# Patient Record
Sex: FEMALE | Race: WHITE | NOT HISPANIC OR LATINO | URBAN - METROPOLITAN AREA
[De-identification: names, ages, dates, MRNs, and addresses within clinical notes are randomized per-mention and may not be internally consistent; named-entity substitution may affect disease eponyms.]

---

## 2018-08-13 ENCOUNTER — EMERGENCY (EMERGENCY)
Facility: HOSPITAL | Age: 38
LOS: 0 days | Discharge: HOME | End: 2018-08-13
Attending: EMERGENCY MEDICINE | Admitting: EMERGENCY MEDICINE

## 2018-08-13 VITALS
DIASTOLIC BLOOD PRESSURE: 75 MMHG | RESPIRATION RATE: 18 BRPM | OXYGEN SATURATION: 99 % | SYSTOLIC BLOOD PRESSURE: 113 MMHG | TEMPERATURE: 97 F | HEART RATE: 73 BPM

## 2018-08-13 DIAGNOSIS — R51 HEADACHE: ICD-10-CM

## 2018-08-13 DIAGNOSIS — R27.9 UNSPECIFIED LACK OF COORDINATION: ICD-10-CM

## 2018-08-13 DIAGNOSIS — R20.0 ANESTHESIA OF SKIN: ICD-10-CM

## 2018-08-13 DIAGNOSIS — M54.5 LOW BACK PAIN: ICD-10-CM

## 2018-08-13 LAB
ALBUMIN SERPL ELPH-MCNC: 4.6 G/DL — SIGNIFICANT CHANGE UP (ref 3.5–5.2)
ALP SERPL-CCNC: 43 U/L — SIGNIFICANT CHANGE UP (ref 30–115)
ALT FLD-CCNC: 16 U/L — SIGNIFICANT CHANGE UP (ref 0–41)
ANION GAP SERPL CALC-SCNC: 14 MMOL/L — SIGNIFICANT CHANGE UP (ref 7–14)
AST SERPL-CCNC: 18 U/L — SIGNIFICANT CHANGE UP (ref 0–41)
BASOPHILS # BLD AUTO: 0.04 K/UL — SIGNIFICANT CHANGE UP (ref 0–0.2)
BASOPHILS NFR BLD AUTO: 0.5 % — SIGNIFICANT CHANGE UP (ref 0–1)
BILIRUB SERPL-MCNC: 0.5 MG/DL — SIGNIFICANT CHANGE UP (ref 0.2–1.2)
BUN SERPL-MCNC: 9 MG/DL — LOW (ref 10–20)
CALCIUM SERPL-MCNC: 9.7 MG/DL — SIGNIFICANT CHANGE UP (ref 8.5–10.1)
CHLORIDE SERPL-SCNC: 103 MMOL/L — SIGNIFICANT CHANGE UP (ref 98–110)
CO2 SERPL-SCNC: 23 MMOL/L — SIGNIFICANT CHANGE UP (ref 17–32)
CREAT SERPL-MCNC: 0.5 MG/DL — LOW (ref 0.7–1.5)
EOSINOPHIL # BLD AUTO: 0.02 K/UL — SIGNIFICANT CHANGE UP (ref 0–0.7)
EOSINOPHIL NFR BLD AUTO: 0.3 % — SIGNIFICANT CHANGE UP (ref 0–8)
GLUCOSE SERPL-MCNC: 92 MG/DL — SIGNIFICANT CHANGE UP (ref 70–99)
HCT VFR BLD CALC: 41 % — SIGNIFICANT CHANGE UP (ref 37–47)
HGB BLD-MCNC: 13.9 G/DL — SIGNIFICANT CHANGE UP (ref 12–16)
IMM GRANULOCYTES NFR BLD AUTO: 0.3 % — SIGNIFICANT CHANGE UP (ref 0.1–0.3)
LYMPHOCYTES # BLD AUTO: 1.54 K/UL — SIGNIFICANT CHANGE UP (ref 1.2–3.4)
LYMPHOCYTES # BLD AUTO: 20.6 % — SIGNIFICANT CHANGE UP (ref 20.5–51.1)
MAGNESIUM SERPL-MCNC: 2 MG/DL — SIGNIFICANT CHANGE UP (ref 1.8–2.4)
MCHC RBC-ENTMCNC: 28.3 PG — SIGNIFICANT CHANGE UP (ref 27–31)
MCHC RBC-ENTMCNC: 33.9 G/DL — SIGNIFICANT CHANGE UP (ref 32–37)
MCV RBC AUTO: 83.3 FL — SIGNIFICANT CHANGE UP (ref 81–99)
MONOCYTES # BLD AUTO: 0.5 K/UL — SIGNIFICANT CHANGE UP (ref 0.1–0.6)
MONOCYTES NFR BLD AUTO: 6.7 % — SIGNIFICANT CHANGE UP (ref 1.7–9.3)
NEUTROPHILS # BLD AUTO: 5.36 K/UL — SIGNIFICANT CHANGE UP (ref 1.4–6.5)
NEUTROPHILS NFR BLD AUTO: 71.6 % — SIGNIFICANT CHANGE UP (ref 42.2–75.2)
NRBC # BLD: 0 /100 WBCS — SIGNIFICANT CHANGE UP (ref 0–0)
PLATELET # BLD AUTO: 262 K/UL — SIGNIFICANT CHANGE UP (ref 130–400)
POTASSIUM SERPL-MCNC: 4.6 MMOL/L — SIGNIFICANT CHANGE UP (ref 3.5–5)
POTASSIUM SERPL-SCNC: 4.6 MMOL/L — SIGNIFICANT CHANGE UP (ref 3.5–5)
PROT SERPL-MCNC: 7.3 G/DL — SIGNIFICANT CHANGE UP (ref 6–8)
RBC # BLD: 4.92 M/UL — SIGNIFICANT CHANGE UP (ref 4.2–5.4)
RBC # FLD: 13.3 % — SIGNIFICANT CHANGE UP (ref 11.5–14.5)
SODIUM SERPL-SCNC: 140 MMOL/L — SIGNIFICANT CHANGE UP (ref 135–146)
WBC # BLD: 7.48 K/UL — SIGNIFICANT CHANGE UP (ref 4.8–10.8)
WBC # FLD AUTO: 7.48 K/UL — SIGNIFICANT CHANGE UP (ref 4.8–10.8)

## 2018-08-13 RX ORDER — SODIUM CHLORIDE 9 MG/ML
1000 INJECTION INTRAMUSCULAR; INTRAVENOUS; SUBCUTANEOUS ONCE
Qty: 0 | Refills: 0 | Status: COMPLETED | OUTPATIENT
Start: 2018-08-13 | End: 2018-08-13

## 2018-08-13 RX ORDER — METOCLOPRAMIDE HCL 10 MG
10 TABLET ORAL ONCE
Qty: 0 | Refills: 0 | Status: COMPLETED | OUTPATIENT
Start: 2018-08-13 | End: 2018-08-13

## 2018-08-13 RX ADMIN — SODIUM CHLORIDE 1000 MILLILITER(S): 9 INJECTION INTRAMUSCULAR; INTRAVENOUS; SUBCUTANEOUS at 13:50

## 2018-08-13 RX ADMIN — Medication 10 MILLIGRAM(S): at 13:50

## 2018-08-13 NOTE — ED PROVIDER NOTE - NS ED ROS FT
Review of Systems    Constitutional: (-) fever  Cardiovascular: (-) chest pain, (-) syncope  Respiratory: (-) cough, (-) shortness of breath  Gastrointestinal: (-) vomiting, (-) diarrhea, (-) abdominal pain  Musculoskeletal: (-) neck pain, (-) back pain, (-) joint pain  Integumentary: (-) rash, (-) edema    Except as documented in the HPI, all other systems are negative.

## 2018-08-13 NOTE — ED PROVIDER NOTE - PROGRESS NOTE DETAILS
pt improved, ct and labs discussed, pt comfortable with plan for d/c and follow up with pmd, labs and imaging results printed and given to pt. Pt does not want to wait for ua ucg sample as she notes she had urine test earlier this week and it was negative at that time.

## 2018-08-13 NOTE — ED PROVIDER NOTE - PHYSICAL EXAMINATION
VITAL SIGNS: I have reviewed nursing notes and confirm.  CONSTITUTIONAL: non-toxic, well appearing  SKIN: no rash, no petechiae.  EYES: PERRL, EOMI, pink conjunctiva, anicteric  ENT: tongue midline, no exudates, MMM  NECK: Supple; no meningismus, no JVD  CARD: RRR, no murmurs, equal radial pulses bilaterally 2+  RESP: CTAB, no respiratory distress  ABD: Soft, non-tender, non-distended, no peritoneal signs, no HSM, no CVA tenderness  EXT: Normal ROM x4. No edema. No calves tenderness  NEURO: Alert, oriented. CN2-12 intact, equal strength bilaterally, nl gait.  PSYCH: Cooperative, appropriate.

## 2018-08-13 NOTE — ED PROVIDER NOTE - OBJECTIVE STATEMENT
36 yo female denies significant pmhx p/w head pressure x 1 month. Associated symptoms include intermittent loss of balance and also b/l hand numbness x 2 days. Saw her PMD Dr. Mitchell on Monday and had blood work done but does not have results yet. Pt notes that she also got nervous that something was going on and has been worried x 2 days and having some episodes of breathing fast due to being anxious.  Notes tylenol does relieve pain.  No n/v/f/c/cp/sob/abd pain. No trauma.  No photophobia/neck stiffness/motor deficit. Also notes some lower back pain radiating to right leg x 4 days.  Denies recent travel or exposure to ticks/time in woods or recent rash.  Pressure on head is described as diffuse to top of head.

## 2018-12-03 ENCOUNTER — OUTPATIENT (OUTPATIENT)
Dept: OUTPATIENT SERVICES | Facility: HOSPITAL | Age: 38
LOS: 1 days | Discharge: HOME | End: 2018-12-03

## 2018-12-03 DIAGNOSIS — N64.4 MASTODYNIA: ICD-10-CM

## 2018-12-03 DIAGNOSIS — N63.20 UNSPECIFIED LUMP IN THE LEFT BREAST, UNSPECIFIED QUADRANT: ICD-10-CM

## 2019-11-29 ENCOUNTER — EMERGENCY (EMERGENCY)
Facility: HOSPITAL | Age: 39
LOS: 0 days | Discharge: HOME | End: 2019-11-29
Attending: EMERGENCY MEDICINE | Admitting: EMERGENCY MEDICINE
Payer: SUBSIDIZED

## 2019-11-29 VITALS
DIASTOLIC BLOOD PRESSURE: 80 MMHG | HEART RATE: 78 BPM | TEMPERATURE: 96 F | OXYGEN SATURATION: 98 % | RESPIRATION RATE: 18 BRPM | SYSTOLIC BLOOD PRESSURE: 129 MMHG | WEIGHT: 115.08 LBS

## 2019-11-29 DIAGNOSIS — R10.9 UNSPECIFIED ABDOMINAL PAIN: ICD-10-CM

## 2019-11-29 DIAGNOSIS — R07.2 PRECORDIAL PAIN: ICD-10-CM

## 2019-11-29 DIAGNOSIS — R10.13 EPIGASTRIC PAIN: ICD-10-CM

## 2019-11-29 DIAGNOSIS — R11.0 NAUSEA: ICD-10-CM

## 2019-11-29 DIAGNOSIS — R05 COUGH: ICD-10-CM

## 2019-11-29 LAB
ALBUMIN SERPL ELPH-MCNC: 4.8 G/DL — SIGNIFICANT CHANGE UP (ref 3.5–5.2)
ALP SERPL-CCNC: 44 U/L — SIGNIFICANT CHANGE UP (ref 30–115)
ALT FLD-CCNC: 13 U/L — SIGNIFICANT CHANGE UP (ref 0–41)
ANION GAP SERPL CALC-SCNC: 13 MMOL/L — SIGNIFICANT CHANGE UP (ref 7–14)
AST SERPL-CCNC: 17 U/L — SIGNIFICANT CHANGE UP (ref 0–41)
BASOPHILS # BLD AUTO: 0.04 K/UL — SIGNIFICANT CHANGE UP (ref 0–0.2)
BASOPHILS NFR BLD AUTO: 0.5 % — SIGNIFICANT CHANGE UP (ref 0–1)
BILIRUB SERPL-MCNC: 0.5 MG/DL — SIGNIFICANT CHANGE UP (ref 0.2–1.2)
BUN SERPL-MCNC: 13 MG/DL — SIGNIFICANT CHANGE UP (ref 10–20)
CALCIUM SERPL-MCNC: 9.2 MG/DL — SIGNIFICANT CHANGE UP (ref 8.5–10.1)
CHLORIDE SERPL-SCNC: 104 MMOL/L — SIGNIFICANT CHANGE UP (ref 98–110)
CO2 SERPL-SCNC: 25 MMOL/L — SIGNIFICANT CHANGE UP (ref 17–32)
CREAT SERPL-MCNC: 0.5 MG/DL — LOW (ref 0.7–1.5)
EOSINOPHIL # BLD AUTO: 0.02 K/UL — SIGNIFICANT CHANGE UP (ref 0–0.7)
EOSINOPHIL NFR BLD AUTO: 0.3 % — SIGNIFICANT CHANGE UP (ref 0–8)
GLUCOSE SERPL-MCNC: 89 MG/DL — SIGNIFICANT CHANGE UP (ref 70–99)
HCT VFR BLD CALC: 40.4 % — SIGNIFICANT CHANGE UP (ref 37–47)
HGB BLD-MCNC: 13.4 G/DL — SIGNIFICANT CHANGE UP (ref 12–16)
IMM GRANULOCYTES NFR BLD AUTO: 0.1 % — SIGNIFICANT CHANGE UP (ref 0.1–0.3)
LIDOCAIN IGE QN: 43 U/L — SIGNIFICANT CHANGE UP (ref 7–60)
LYMPHOCYTES # BLD AUTO: 1.23 K/UL — SIGNIFICANT CHANGE UP (ref 1.2–3.4)
LYMPHOCYTES # BLD AUTO: 16.4 % — LOW (ref 20.5–51.1)
MCHC RBC-ENTMCNC: 28.2 PG — SIGNIFICANT CHANGE UP (ref 27–31)
MCHC RBC-ENTMCNC: 33.2 G/DL — SIGNIFICANT CHANGE UP (ref 32–37)
MCV RBC AUTO: 85.1 FL — SIGNIFICANT CHANGE UP (ref 81–99)
MONOCYTES # BLD AUTO: 0.44 K/UL — SIGNIFICANT CHANGE UP (ref 0.1–0.6)
MONOCYTES NFR BLD AUTO: 5.9 % — SIGNIFICANT CHANGE UP (ref 1.7–9.3)
NEUTROPHILS # BLD AUTO: 5.76 K/UL — SIGNIFICANT CHANGE UP (ref 1.4–6.5)
NEUTROPHILS NFR BLD AUTO: 76.8 % — HIGH (ref 42.2–75.2)
NRBC # BLD: 0 /100 WBCS — SIGNIFICANT CHANGE UP (ref 0–0)
PLATELET # BLD AUTO: 263 K/UL — SIGNIFICANT CHANGE UP (ref 130–400)
POTASSIUM SERPL-MCNC: 3.9 MMOL/L — SIGNIFICANT CHANGE UP (ref 3.5–5)
POTASSIUM SERPL-SCNC: 3.9 MMOL/L — SIGNIFICANT CHANGE UP (ref 3.5–5)
PROT SERPL-MCNC: 7.1 G/DL — SIGNIFICANT CHANGE UP (ref 6–8)
RBC # BLD: 4.75 M/UL — SIGNIFICANT CHANGE UP (ref 4.2–5.4)
RBC # FLD: 13 % — SIGNIFICANT CHANGE UP (ref 11.5–14.5)
SODIUM SERPL-SCNC: 142 MMOL/L — SIGNIFICANT CHANGE UP (ref 135–146)
TROPONIN T SERPL-MCNC: <0.01 NG/ML — SIGNIFICANT CHANGE UP
WBC # BLD: 7.5 K/UL — SIGNIFICANT CHANGE UP (ref 4.8–10.8)
WBC # FLD AUTO: 7.5 K/UL — SIGNIFICANT CHANGE UP (ref 4.8–10.8)

## 2019-11-29 PROCEDURE — 99284 EMERGENCY DEPT VISIT MOD MDM: CPT

## 2019-11-29 RX ORDER — DIPHENHYDRAMINE HYDROCHLORIDE AND LIDOCAINE HYDROCHLORIDE AND ALUMINUM HYDROXIDE AND MAGNESIUM HYDRO
30 KIT ONCE
Refills: 0 | Status: COMPLETED | OUTPATIENT
Start: 2019-11-29 | End: 2019-11-29

## 2019-11-29 RX ORDER — PANTOPRAZOLE SODIUM 20 MG/1
40 TABLET, DELAYED RELEASE ORAL ONCE
Refills: 0 | Status: COMPLETED | OUTPATIENT
Start: 2019-11-29 | End: 2019-11-29

## 2019-11-29 RX ORDER — SUCRALFATE 1 G
1 TABLET ORAL ONCE
Refills: 0 | Status: COMPLETED | OUTPATIENT
Start: 2019-11-29 | End: 2019-11-29

## 2019-11-29 RX ORDER — FAMOTIDINE 10 MG/ML
20 INJECTION INTRAVENOUS ONCE
Refills: 0 | Status: COMPLETED | OUTPATIENT
Start: 2019-11-29 | End: 2019-11-29

## 2019-11-29 RX ORDER — SODIUM CHLORIDE 9 MG/ML
1000 INJECTION, SOLUTION INTRAVENOUS ONCE
Refills: 0 | Status: COMPLETED | OUTPATIENT
Start: 2019-11-29 | End: 2019-11-29

## 2019-11-29 RX ADMIN — DIPHENHYDRAMINE HYDROCHLORIDE AND LIDOCAINE HYDROCHLORIDE AND ALUMINUM HYDROXIDE AND MAGNESIUM HYDRO 30 MILLILITER(S): KIT at 13:53

## 2019-11-29 RX ADMIN — SODIUM CHLORIDE 1000 MILLILITER(S): 9 INJECTION, SOLUTION INTRAVENOUS at 12:39

## 2019-11-29 RX ADMIN — FAMOTIDINE 20 MILLIGRAM(S): 10 INJECTION INTRAVENOUS at 12:40

## 2019-11-29 RX ADMIN — PANTOPRAZOLE SODIUM 40 MILLIGRAM(S): 20 TABLET, DELAYED RELEASE ORAL at 12:40

## 2019-11-29 RX ADMIN — Medication 1 GRAM(S): at 13:53

## 2019-11-29 NOTE — ED ADULT NURSE NOTE - NSIMPLEMENTINTERV_GEN_ALL_ED
Implemented All Universal Safety Interventions:  [place] to call system. Call bell, personal items and telephone within reach. Instruct patient to call for assistance. Room bathroom lighting operational. Non-slip footwear when patient is off stretcher. Physically safe environment: no spills, clutter or unnecessary equipment. Stretcher in lowest position, wheels locked, appropriate side rails in place.

## 2019-11-29 NOTE — ED PROVIDER NOTE - PHYSICAL EXAMINATION
PHYSICAL EXAM:  GENERAL: NAD, lying in bed comfortably  HEAD:  Atraumatic, Normocephalic  EYES: EOMI, PERRLA, conjunctiva and sclera clear  ENT: Moist mucous membranes  NECK: Supple, No JVD  CHEST/LUNG: Clear to auscultation bilaterally; No rales, rhonchi, wheezing, or rubs. Unlabored respirations  HEART: Regular rate and rhythm; No murmurs, rubs, or gallops  ABDOMEN: Bowel sounds present; soft, epigastric tenderness. No guarding or rebound tenderness  EXTREMITIES:  2+ Peripheral Pulses, brisk capillary refill. No clubbing, cyanosis, or edema  NERVOUS SYSTEM:  Alert & Oriented X3, speech clear. No deficits   MSK: FROM all 4 extremities, full and equal strength  SKIN: No rashes or lesions

## 2019-11-29 NOTE — ED PROVIDER NOTE - NSFOLLOWUPCLINICS_GEN_ALL_ED_FT
A Family Medicine Doctor  Family Medicine  .  [place]   Phone:   Fax:   Follow Up Time:     A Gastroenterologist  Gastroenterology  .  [place]   Phone:   Fax:   Follow Up Time:

## 2019-11-29 NOTE — ED PROVIDER NOTE - PATIENT PORTAL LINK FT
You can access the FollowMyHealth Patient Portal offered by [facility] by registering at the following website: [email or web address]. By joining [employer]’s FollowMyHealth portal, you will also be able to view your health information using other applications (apps) compatible with our system.

## 2019-11-29 NOTE — ED PROVIDER NOTE - NSFOLLOWUPINSTRUCTIONS_ED_ALL_ED_FT
Please follow up with PMD in 1-2 days and Gastroenterology within 5-7 days.  Please return to the ED if symptoms worsen or if you experience symptoms including but not limited to vomiting, blood in vomit, bloody stool, black stool, worsening chest pain, palpitation, dizziness.

## 2019-11-29 NOTE — ED PROVIDER NOTE - OBJECTIVE STATEMENT
39 yr F no PMH presenting with abd pain / chest pain. Abd pain persistent for the last month with radiation to substernal pain. 8/10 sharp worse at night when lying down. Associated with nausea, persistent dry cough, acidic taste in mouth in am. seen by cardiology and reports stress test neg. Seen by GI started on PPI yesterday without improvement.   Denies weight lose, diarrhea, fever, chills, diaphoresis, palpitation, headache.

## 2019-11-29 NOTE — ED PROVIDER NOTE - PROGRESS NOTE DETAILS
Pt reports symptoms improved and pain resolved. Will discharge home with follow up with GI ATTENDING NOTE: 40 y/o f PMH prior cardiac work up, including stress testing in last week due to pain in L chest and ABD all came back negative. Pt is here today for sx that have not resolved and are worse with lying down associated with throat burning and cough. Exam: NAD, NCAT, HEENT: mmm, EOMI, PERRLA, Neck: supple, nontender, nl ROM, Heart: RRR, no murmur, Extremities 2+ b/l pulses intact. Lungs: BCTA, no signs of increased WOB, Abd: NTND, no guarding or rebound, no hernia palpated, no CVAT. MSK: chest, back, and ext nontender, nl rom, no deformity. Neuro: A&amp;Ox3, normal strength, nl sensation throughout, normal speech. A/P: likely gastritis/ dyspepsia vs ulcer. Evaluate once again for ACS with EKG and troponin, labs, symptom, control reassess.

## 2019-11-29 NOTE — ED PROVIDER NOTE - CLINICAL SUMMARY MEDICAL DECISION MAKING FREE TEXT BOX
pw abd pain radiating into chest, has had cardiac workup very recently, all negative. labs and imaging within normal limits. refused EKG. Patient to be discharged from ED. Any available test results were discussed with family. Verbal instructions given, including instructions to return to ED immediately for any new, worsening, or concerning symptoms. family endorsed understanding. Written discharge instructions additionally given, including follow-up plan.

## 2019-12-11 ENCOUNTER — TRANSCRIPTION ENCOUNTER (OUTPATIENT)
Age: 39
End: 2019-12-11

## 2019-12-11 ENCOUNTER — OUTPATIENT (OUTPATIENT)
Dept: OUTPATIENT SERVICES | Facility: HOSPITAL | Age: 39
LOS: 1 days | Discharge: HOME | End: 2019-12-11
Payer: COMMERCIAL

## 2019-12-11 ENCOUNTER — RESULT REVIEW (OUTPATIENT)
Age: 39
End: 2019-12-11

## 2019-12-11 VITALS
HEART RATE: 77 BPM | TEMPERATURE: 98 F | WEIGHT: 115.96 LBS | DIASTOLIC BLOOD PRESSURE: 73 MMHG | SYSTOLIC BLOOD PRESSURE: 109 MMHG | HEIGHT: 61 IN | RESPIRATION RATE: 20 BRPM

## 2019-12-11 VITALS — RESPIRATION RATE: 17 BRPM | HEART RATE: 63 BPM | SYSTOLIC BLOOD PRESSURE: 100 MMHG | DIASTOLIC BLOOD PRESSURE: 67 MMHG

## 2019-12-11 PROCEDURE — 88305 TISSUE EXAM BY PATHOLOGIST: CPT | Mod: 26

## 2019-12-11 PROCEDURE — 88312 SPECIAL STAINS GROUP 1: CPT | Mod: 26

## 2019-12-11 RX ORDER — OMEPRAZOLE 10 MG/1
1 CAPSULE, DELAYED RELEASE ORAL
Qty: 0 | Refills: 0 | DISCHARGE

## 2019-12-11 NOTE — CHART NOTE - NSCHARTNOTEFT_GEN_A_CORE
PACU ANESTHESIA ADMISSION NOTE      Procedure:   Post op diagnosis:      ____  Intubated  TV:______       Rate: ______      FiO2: ______    __x__  Patent Airway    __x__  Full return of protective reflexes    __x__  Full recovery from anesthesia / back to baseline     Vitals:   T:           R:  11                BP:   111/65               Sat: 99                  P: 65      Mental Status:  _x___ Awake   ___x__ Alert   _____ Drowsy   _____ Sedated    Nausea/Vomiting:  __x__ NO  ______Yes,   See Post - Op Orders          Pain Scale (0-10):  _x____    Treatment: ____ None    ____ See Post - Op/PCA Orders    Post - Operative Fluids:   __x__ Oral   ____ See Post - Op Orders    Plan: Discharge:   __x__Home       _____Floor     _____Critical Care    _____  Other:_________________    Comments: tolerated procedure well

## 2019-12-12 LAB — SURGICAL PATHOLOGY STUDY: SIGNIFICANT CHANGE UP

## 2019-12-16 DIAGNOSIS — K21.9 GASTRO-ESOPHAGEAL REFLUX DISEASE WITHOUT ESOPHAGITIS: ICD-10-CM

## 2019-12-16 DIAGNOSIS — K20.8 OTHER ESOPHAGITIS: ICD-10-CM

## 2019-12-16 DIAGNOSIS — K29.50 UNSPECIFIED CHRONIC GASTRITIS WITHOUT BLEEDING: ICD-10-CM

## 2019-12-16 DIAGNOSIS — K44.9 DIAPHRAGMATIC HERNIA WITHOUT OBSTRUCTION OR GANGRENE: ICD-10-CM

## 2019-12-16 DIAGNOSIS — B96.81 HELICOBACTER PYLORI [H. PYLORI] AS THE CAUSE OF DISEASES CLASSIFIED ELSEWHERE: ICD-10-CM

## 2019-12-16 DIAGNOSIS — K29.80 DUODENITIS WITHOUT BLEEDING: ICD-10-CM

## 2020-05-02 ENCOUNTER — EMERGENCY (EMERGENCY)
Facility: HOSPITAL | Age: 40
LOS: 0 days | Discharge: HOME | End: 2020-05-02
Attending: STUDENT IN AN ORGANIZED HEALTH CARE EDUCATION/TRAINING PROGRAM | Admitting: STUDENT IN AN ORGANIZED HEALTH CARE EDUCATION/TRAINING PROGRAM
Payer: COMMERCIAL

## 2020-05-02 VITALS
SYSTOLIC BLOOD PRESSURE: 110 MMHG | TEMPERATURE: 98 F | OXYGEN SATURATION: 100 % | HEART RATE: 78 BPM | DIASTOLIC BLOOD PRESSURE: 68 MMHG | RESPIRATION RATE: 18 BRPM

## 2020-05-02 DIAGNOSIS — U07.1 COVID-19: ICD-10-CM

## 2020-05-02 DIAGNOSIS — Z79.891 LONG TERM (CURRENT) USE OF OPIATE ANALGESIC: ICD-10-CM

## 2020-05-02 DIAGNOSIS — R07.89 OTHER CHEST PAIN: ICD-10-CM

## 2020-05-02 LAB
ALBUMIN SERPL ELPH-MCNC: 5 G/DL — SIGNIFICANT CHANGE UP (ref 3.5–5.2)
ALP SERPL-CCNC: 53 U/L — SIGNIFICANT CHANGE UP (ref 30–115)
ALT FLD-CCNC: 15 U/L — SIGNIFICANT CHANGE UP (ref 0–41)
ANION GAP SERPL CALC-SCNC: 13 MMOL/L — SIGNIFICANT CHANGE UP (ref 7–14)
AST SERPL-CCNC: 20 U/L — SIGNIFICANT CHANGE UP (ref 0–41)
BILIRUB SERPL-MCNC: 0.7 MG/DL — SIGNIFICANT CHANGE UP (ref 0.2–1.2)
BUN SERPL-MCNC: 8 MG/DL — LOW (ref 10–20)
CALCIUM SERPL-MCNC: 10 MG/DL — SIGNIFICANT CHANGE UP (ref 8.5–10.1)
CHLORIDE SERPL-SCNC: 102 MMOL/L — SIGNIFICANT CHANGE UP (ref 98–110)
CO2 SERPL-SCNC: 25 MMOL/L — SIGNIFICANT CHANGE UP (ref 17–32)
CREAT SERPL-MCNC: 0.6 MG/DL — LOW (ref 0.7–1.5)
GLUCOSE SERPL-MCNC: 100 MG/DL — HIGH (ref 70–99)
HCT VFR BLD CALC: 41.1 % — SIGNIFICANT CHANGE UP (ref 37–47)
HGB BLD-MCNC: 13.4 G/DL — SIGNIFICANT CHANGE UP (ref 12–16)
MCHC RBC-ENTMCNC: 27 PG — SIGNIFICANT CHANGE UP (ref 27–31)
MCHC RBC-ENTMCNC: 32.6 G/DL — SIGNIFICANT CHANGE UP (ref 32–37)
MCV RBC AUTO: 82.9 FL — SIGNIFICANT CHANGE UP (ref 81–99)
NRBC # BLD: 0 /100 WBCS — SIGNIFICANT CHANGE UP (ref 0–0)
PLATELET # BLD AUTO: 271 K/UL — SIGNIFICANT CHANGE UP (ref 130–400)
POTASSIUM SERPL-MCNC: 4.4 MMOL/L — SIGNIFICANT CHANGE UP (ref 3.5–5)
POTASSIUM SERPL-SCNC: 4.4 MMOL/L — SIGNIFICANT CHANGE UP (ref 3.5–5)
PROT SERPL-MCNC: 7.9 G/DL — SIGNIFICANT CHANGE UP (ref 6–8)
RBC # BLD: 4.96 M/UL — SIGNIFICANT CHANGE UP (ref 4.2–5.4)
RBC # FLD: 14.7 % — HIGH (ref 11.5–14.5)
SODIUM SERPL-SCNC: 140 MMOL/L — SIGNIFICANT CHANGE UP (ref 135–146)
TROPONIN T SERPL-MCNC: <0.01 NG/ML — SIGNIFICANT CHANGE UP
WBC # BLD: 11.4 K/UL — HIGH (ref 4.8–10.8)
WBC # FLD AUTO: 11.4 K/UL — HIGH (ref 4.8–10.8)

## 2020-05-02 PROCEDURE — 99285 EMERGENCY DEPT VISIT HI MDM: CPT

## 2020-05-02 PROCEDURE — 93971 EXTREMITY STUDY: CPT | Mod: 26,LT

## 2020-05-02 PROCEDURE — 71045 X-RAY EXAM CHEST 1 VIEW: CPT | Mod: 26

## 2020-05-02 PROCEDURE — 93010 ELECTROCARDIOGRAM REPORT: CPT

## 2020-05-02 NOTE — ED ADULT NURSE NOTE - OBJECTIVE STATEMENT
Pt c/o left sided chest pain that radiates to left neck and arm over a few days. Pt endorses she was covid positive 3 weeks ago, still endorses mild cough and generalized weakness. Denies fever, chills, SOB, N/V, abdominal pain, diarrhea.

## 2020-05-02 NOTE — ED ADULT NURSE NOTE - NSIMPLEMENTINTERV_GEN_ALL_ED
Implemented All Universal Safety Interventions:  [place] to call system. Call bell, personal items and telephone within reach. Instruct patient to call for assistance. Room bathroom lighting operational. Non-slip footwear when patient is off stretcher. Physically safe environment: no spills, clutter or unnecessary equipment. Stretcher in lowest position, wheels locked, appropriate side rails in place.

## 2020-05-02 NOTE — ED PROVIDER NOTE - PATIENT PORTAL LINK FT
You can access the FollowMyHealth Patient Portal offered by [facility] by registering at the following website: [email or web address]. By joining [employer]’s FollowMyHealth portal, you will also be able to view your health information using other applications (apps) compatible with our system.

## 2020-05-02 NOTE — ED PROVIDER NOTE - CLINICAL SUMMARY MEDICAL DECISION MAKING FREE TEXT BOX
pt w/ chest and arm pain. cxr wnl. trop neg. ekg non ischemic, US no UE DVT. IMP: COVID. Do not suspect yanci cardiac or vascular etiology given HPI, PE, w/up. Discussed all available results.  Pt and/ or family understand results, plan of care, outpt follow up as discussed, and signs and symptoms for ED return.  Pt/ family is comfortable with discharge.

## 2020-05-02 NOTE — ED PROVIDER NOTE - OBJECTIVE STATEMENT
The pt is a 39y F w/ no pmh, covid (+) diagnosis 3 weeks ago presenting for intermittent, retrosternal chest pain that radiates to the L neck, L arm x several days. Pt also notes LUE feeling heavy. No aggravation/alleviating factors. She went to [facility] which she says wanted her to get evaluated for blood clots. Pt says her covid symptoms have resolved except for some cough, generalized body aches which she still has intermittently. Denies fever, SOB, N/V, abdominal pain, diarrhea. No surgical hx.

## 2020-05-02 NOTE — ED PROVIDER NOTE - NSFOLLOWUPINSTRUCTIONS_ED_ALL_ED_FT
FOLLOW UP WITH YOUR DOCTOR IN 3-5 DAYS IF SYMPTOMS DO NOT IMPROVE.  FOR NEW OR WORSENING SYMPTOMS, RETURN TO THE ER.      If you are sick with COVID-19 or suspect you are infected with the virus that causes COVID-19, follow the steps below to help prevent the disease from spreading to people in your home and community.   https://www.cdc.gov/coronavirus/2019-ncov/downloads/sick-with-2019-nCoV-fact-sheet.pdf    Stay home except to get medical care   You should restrict activities outside your home, except for getting medical care. Do not go to work, school, or public areas. Avoid using public transportation, ride-sharing, or taxis.   Separate yourself from other people and animals in your home   People: As much as possible, you should stay in a specific room and away from other people in your home. Also, you should use a separate bathroom, if available.   Animals: Do not handle pets or other animals while sick. See COVID-19 and Animals for more information.   Call ahead before visiting your doctor   If you have a medical appointment, call the healthcare provider and tell them that you have or may have COVID-19. This will help the healthcare provider’s office take steps to keep other people from getting infected or exposed.   Wear a facemask   You should wear a facemask when you are around other people (e.g., sharing a room or vehicle) or pets and before you enter a healthcare provider’s office. If you are not able to wear a facemask (for example, because it causes trouble breathing), then people who live with you should not stay in the same room with you, or they should wear a facemask if they enter your room.   Cover your coughs and sneezes   Cover your mouth and nose with a tissue when you cough or sneeze. Throw used tissues in a lined trash can; immediately wash your hands with soap and water for at least 20 seconds or clean your hands with an alcohol-based hand  that contains at least 60% alcohol covering all surfaces of your hands and rubbing them together until they feel dry. Soap and water should be used preferentially if hands are visibly dirty.   Avoid sharing personal household items   You should not share dishes, drinking glasses, cups, eating utensils, towels, or bedding with other people or pets in your home. After using these items, they should be washed thoroughly with soap and water.   Clean your hands often   Wash your hands often with soap and water for at least 20 seconds. If soap and water are not available, clean your hands with an alcohol-based hand  that contains at least 60% alcohol, covering all surfaces of your hands and rubbing them together until they feel dry. Soap and water should be used preferentially if hands are visibly dirty. Avoid touching your eyes, nose, and mouth with unwashed hands.   Clean all “high-touch” surfaces every day   High touch surfaces include counters, tabletops, doorknobs, bathroom fixtures, toilets, phones, keyboards, tablets, and bedside tables. Also, clean any surfaces that may have blood, stool, or body fluids on them. Use a household cleaning spray or wipe, according to the label instructions. Labels contain instructions for safe and effective use of the cleaning product including precautions you should take when applying the product, such as wearing gloves and making sure you have good ventilation during use of the product.   Monitor your symptoms   Seek prompt medical attention if your illness is worsening (e.g., difficulty breathing). Before seeking care, call your healthcare provider and tell them that you have, or are being evaluated for, COVID-19. Put on a facemask before you enter the facility. These steps will help the healthcare provider’s office to keep other people in the office or waiting room from getting infected or exposed. Ask your healthcare provider to call the local or state health department. Persons who are placed under active monitoring or facilitated self-monitoring should follow instructions provided by their local health department or occupational health professionals, as appropriate. When working with your local health department check their available hours. If you have a medical emergency and need to call 911, notify the dispatch personnel that you have, or are being evaluated for COVID-19. If possible, put on a facemask before emergency medical services arrive.   Discontinuing home isolation   Patients with confirmed COVID-19 should remain under home isolation precautions until the risk of secondary transmission to others is thought to be low. The decision to discontinue home isolation precautions should be made on a case-by-case basis, in consultation with healthcare providers and state and local health departments.  For more information: www.cdc.gov/COVID19      Chest Pain    Chest pain can be caused by many different conditions which may or may not be dangerous. Causes include heartburn, lung infections, heart attack, blood clot in lungs, skin infections, strain or damage to muscle, cartilage, or bones, etc. Lab tests or other studies including an electrocardiogram (EKG) may have been performed to find the cause of your pain. Make sure to follow up with a cardiologist or as instructed by your health care professional.    SEEK IMMEDIATE MEDICAL CARE IF YOU HAVE THE FOLLOWING SYMPTOMS: worsening chest pain, coughing up blood, unexplained back/neck/jaw pain, severe abdominal pain, dizziness or lightheadedness, shortness of breath, sweaty or clammy skin, vomiting, or racing heart beat. These symptoms may represent a serious problem that is an emergency. Do not wait to see if the symptoms will go away. Get medical help right away. Call your local emergency services (911 in the [place] Do not drive yourself to the hospital.

## 2020-05-02 NOTE — ED PROVIDER NOTE - ATTENDING CONTRIBUTION TO CARE
38 y/o F no sig pmh, no smoking, COVID (+) test 3wks ago, p/w intermittent chest discomfort, improving x 6wks. + myalgia + fatigue. + cough. c/o LUE arm "heaviness" and rad of discomfort from chest to neck x 3-4d. No fever, trauma, neuro complaint. Pt describes arm pain somewhat in venous distribution (?) ROS PE above; LUE NL inspection, NL pulses, well perfused. NL exam. Pt has no complaints presently. IMP: cp low risk. consider VTE considering COVID. P: labs, cxr, ekg, UE Duplex, reassess.

## 2020-05-02 NOTE — ED PROVIDER NOTE - NS ED ROS FT
Constitutional: (-) fever  Eyes/ENT: (-) blurry vision, (-) epistaxis  Cardiovascular: (+) chest pain, (-) syncope  Respiratory: (+) cough, (-) shortness of breath  Gastrointestinal: (-) vomiting, (-) diarrhea  Musculoskeletal: (+) neck pain, (+) LUE pain, (-) back pain, (-) joint pain  Integumentary: (-) rash, (-) edema  Neurological: (-) headache, (-) altered mental status  Psychiatric: (-) hallucinations  Allergic/Immunologic: (-) pruritus

## 2020-05-02 NOTE — ED PROVIDER NOTE - PHYSICAL EXAMINATION
Vital Signs: I have reviewed the initial vital signs.  Constitutional: well-nourished, appears stated age, no acute distress  Cardiovascular: regular rate, regular rhythm, well-perfused extremities  Respiratory: unlabored respiratory effort, clear to auscultation bilaterally  Gastrointestinal: soft, non-tender abdomen  Musculoskeletal: supple neck, no lower extremity edema. LUE w/ no edema, no palpable tenderness. Neurovascularly intact.   Integumentary: warm, dry. (+) Erythematous round rash 2x2cm L volar wrist area  Neurologic: awake, alert, extremities’ motor and sensory functions grossly intact  Psychiatric: appropriate mood, appropriate affect

## 2020-08-25 ENCOUNTER — OUTPATIENT (OUTPATIENT)
Dept: OUTPATIENT SERVICES | Facility: HOSPITAL | Age: 40
LOS: 1 days | Discharge: HOME | End: 2020-08-25
Payer: COMMERCIAL

## 2020-08-25 DIAGNOSIS — N64.52 NIPPLE DISCHARGE: ICD-10-CM

## 2020-08-25 PROCEDURE — 77066 DX MAMMO INCL CAD BI: CPT | Mod: 26

## 2020-08-25 PROCEDURE — 76642 ULTRASOUND BREAST LIMITED: CPT | Mod: 26,LT

## 2020-08-25 PROCEDURE — G0279: CPT | Mod: 26

## 2022-01-09 ENCOUNTER — INPATIENT (INPATIENT)
Facility: HOSPITAL | Age: 42
LOS: 1 days | Discharge: HOME | End: 2022-01-11
Attending: INTERNAL MEDICINE | Admitting: INTERNAL MEDICINE
Payer: COMMERCIAL

## 2022-01-09 VITALS
SYSTOLIC BLOOD PRESSURE: 124 MMHG | HEIGHT: 61 IN | TEMPERATURE: 102 F | HEART RATE: 113 BPM | RESPIRATION RATE: 18 BRPM | DIASTOLIC BLOOD PRESSURE: 74 MMHG | OXYGEN SATURATION: 99 % | WEIGHT: 119.93 LBS

## 2022-01-09 LAB
ALBUMIN SERPL ELPH-MCNC: 4.6 G/DL — SIGNIFICANT CHANGE UP (ref 3.5–5.2)
ALP SERPL-CCNC: 70 U/L — SIGNIFICANT CHANGE UP (ref 30–115)
ALT FLD-CCNC: 34 U/L — SIGNIFICANT CHANGE UP (ref 0–41)
ANION GAP SERPL CALC-SCNC: 16 MMOL/L — HIGH (ref 7–14)
APPEARANCE UR: CLEAR — SIGNIFICANT CHANGE UP
AST SERPL-CCNC: 36 U/L — SIGNIFICANT CHANGE UP (ref 0–41)
BACTERIA # UR AUTO: NEGATIVE — SIGNIFICANT CHANGE UP
BASOPHILS # BLD AUTO: 0.01 K/UL — SIGNIFICANT CHANGE UP (ref 0–0.2)
BASOPHILS NFR BLD AUTO: 0.2 % — SIGNIFICANT CHANGE UP (ref 0–1)
BILIRUB DIRECT SERPL-MCNC: <0.2 MG/DL — SIGNIFICANT CHANGE UP (ref 0–0.3)
BILIRUB INDIRECT FLD-MCNC: <0.2 MG/DL — SIGNIFICANT CHANGE UP (ref 0.2–1.2)
BILIRUB SERPL-MCNC: <0.2 MG/DL — SIGNIFICANT CHANGE UP (ref 0.2–1.2)
BILIRUB UR-MCNC: NEGATIVE — SIGNIFICANT CHANGE UP
BUN SERPL-MCNC: 8 MG/DL — LOW (ref 10–20)
CALCIUM SERPL-MCNC: 9.2 MG/DL — SIGNIFICANT CHANGE UP (ref 8.5–10.1)
CHLORIDE SERPL-SCNC: 100 MMOL/L — SIGNIFICANT CHANGE UP (ref 98–110)
CO2 SERPL-SCNC: 19 MMOL/L — SIGNIFICANT CHANGE UP (ref 17–32)
COLOR SPEC: SIGNIFICANT CHANGE UP
CREAT SERPL-MCNC: 0.6 MG/DL — LOW (ref 0.7–1.5)
DIFF PNL FLD: ABNORMAL
EOSINOPHIL # BLD AUTO: 0.27 K/UL — SIGNIFICANT CHANGE UP (ref 0–0.7)
EOSINOPHIL NFR BLD AUTO: 6.7 % — SIGNIFICANT CHANGE UP (ref 0–8)
EPI CELLS # UR: 1 /HPF — SIGNIFICANT CHANGE UP (ref 0–5)
GLUCOSE SERPL-MCNC: 99 MG/DL — SIGNIFICANT CHANGE UP (ref 70–99)
GLUCOSE UR QL: NEGATIVE — SIGNIFICANT CHANGE UP
HCG SERPL QL: NEGATIVE — SIGNIFICANT CHANGE UP
HCT VFR BLD CALC: 39.4 % — SIGNIFICANT CHANGE UP (ref 37–47)
HGB BLD-MCNC: 13.1 G/DL — SIGNIFICANT CHANGE UP (ref 12–16)
HYALINE CASTS # UR AUTO: 0 /LPF — SIGNIFICANT CHANGE UP (ref 0–7)
IMM GRANULOCYTES NFR BLD AUTO: 0.5 % — HIGH (ref 0.1–0.3)
KETONES UR-MCNC: ABNORMAL
LACTATE SERPL-SCNC: 1 MMOL/L — SIGNIFICANT CHANGE UP (ref 0.7–2)
LEUKOCYTE ESTERASE UR-ACNC: NEGATIVE — SIGNIFICANT CHANGE UP
LIDOCAIN IGE QN: 55 U/L — SIGNIFICANT CHANGE UP (ref 7–60)
LYMPHOCYTES # BLD AUTO: 0.33 K/UL — LOW (ref 1.2–3.4)
LYMPHOCYTES # BLD AUTO: 8.2 % — LOW (ref 20.5–51.1)
MCHC RBC-ENTMCNC: 26.5 PG — LOW (ref 27–31)
MCHC RBC-ENTMCNC: 33.2 G/DL — SIGNIFICANT CHANGE UP (ref 32–37)
MCV RBC AUTO: 79.6 FL — LOW (ref 81–99)
MONOCYTES # BLD AUTO: 0.27 K/UL — SIGNIFICANT CHANGE UP (ref 0.1–0.6)
MONOCYTES NFR BLD AUTO: 6.7 % — SIGNIFICANT CHANGE UP (ref 1.7–9.3)
NEUTROPHILS # BLD AUTO: 3.14 K/UL — SIGNIFICANT CHANGE UP (ref 1.4–6.5)
NEUTROPHILS NFR BLD AUTO: 77.7 % — HIGH (ref 42.2–75.2)
NITRITE UR-MCNC: NEGATIVE — SIGNIFICANT CHANGE UP
NRBC # BLD: 0 /100 WBCS — SIGNIFICANT CHANGE UP (ref 0–0)
PH UR: 6.5 — SIGNIFICANT CHANGE UP (ref 5–8)
PLATELET # BLD AUTO: 189 K/UL — SIGNIFICANT CHANGE UP (ref 130–400)
POTASSIUM SERPL-MCNC: 4.4 MMOL/L — SIGNIFICANT CHANGE UP (ref 3.5–5)
POTASSIUM SERPL-SCNC: 4.4 MMOL/L — SIGNIFICANT CHANGE UP (ref 3.5–5)
PROT SERPL-MCNC: 7.5 G/DL — SIGNIFICANT CHANGE UP (ref 6–8)
PROT UR-MCNC: SIGNIFICANT CHANGE UP
RBC # BLD: 4.95 M/UL — SIGNIFICANT CHANGE UP (ref 4.2–5.4)
RBC # FLD: 15.1 % — HIGH (ref 11.5–14.5)
RBC CASTS # UR COMP ASSIST: 1 /HPF — SIGNIFICANT CHANGE UP (ref 0–4)
SARS-COV-2 RNA SPEC QL NAA+PROBE: SIGNIFICANT CHANGE UP
SODIUM SERPL-SCNC: 135 MMOL/L — SIGNIFICANT CHANGE UP (ref 135–146)
SP GR SPEC: 1.02 — SIGNIFICANT CHANGE UP (ref 1.01–1.03)
UROBILINOGEN FLD QL: SIGNIFICANT CHANGE UP
WBC # BLD: 4.04 K/UL — LOW (ref 4.8–10.8)
WBC # FLD AUTO: 4.04 K/UL — LOW (ref 4.8–10.8)
WBC UR QL: 1 /HPF — SIGNIFICANT CHANGE UP (ref 0–5)

## 2022-01-09 PROCEDURE — 74177 CT ABD & PELVIS W/CONTRAST: CPT | Mod: 26

## 2022-01-09 PROCEDURE — 99285 EMERGENCY DEPT VISIT HI MDM: CPT

## 2022-01-09 RX ORDER — SODIUM CHLORIDE 9 MG/ML
1500 INJECTION INTRAMUSCULAR; INTRAVENOUS; SUBCUTANEOUS ONCE
Refills: 0 | Status: COMPLETED | OUTPATIENT
Start: 2022-01-09 | End: 2022-01-09

## 2022-01-09 RX ORDER — SODIUM CHLORIDE 9 MG/ML
1000 INJECTION INTRAMUSCULAR; INTRAVENOUS; SUBCUTANEOUS
Refills: 0 | Status: DISCONTINUED | OUTPATIENT
Start: 2022-01-09 | End: 2022-01-10

## 2022-01-09 RX ORDER — SODIUM CHLORIDE 9 MG/ML
1000 INJECTION INTRAMUSCULAR; INTRAVENOUS; SUBCUTANEOUS ONCE
Refills: 0 | Status: COMPLETED | OUTPATIENT
Start: 2022-01-09 | End: 2022-01-09

## 2022-01-09 RX ORDER — CEFTRIAXONE 500 MG/1
1000 INJECTION, POWDER, FOR SOLUTION INTRAMUSCULAR; INTRAVENOUS ONCE
Refills: 0 | Status: COMPLETED | OUTPATIENT
Start: 2022-01-09 | End: 2022-01-09

## 2022-01-09 RX ORDER — ACETAMINOPHEN 500 MG
650 TABLET ORAL ONCE
Refills: 0 | Status: COMPLETED | OUTPATIENT
Start: 2022-01-09 | End: 2022-01-09

## 2022-01-09 RX ORDER — KETOROLAC TROMETHAMINE 30 MG/ML
15 SYRINGE (ML) INJECTION ONCE
Refills: 0 | Status: DISCONTINUED | OUTPATIENT
Start: 2022-01-09 | End: 2022-01-09

## 2022-01-09 RX ADMIN — SODIUM CHLORIDE 1500 MILLILITER(S): 9 INJECTION INTRAMUSCULAR; INTRAVENOUS; SUBCUTANEOUS at 20:16

## 2022-01-09 RX ADMIN — Medication 15 MILLIGRAM(S): at 20:15

## 2022-01-09 RX ADMIN — Medication 650 MILLIGRAM(S): at 20:14

## 2022-01-09 RX ADMIN — Medication 650 MILLIGRAM(S): at 20:44

## 2022-01-09 RX ADMIN — CEFTRIAXONE 1000 MILLIGRAM(S): 500 INJECTION, POWDER, FOR SOLUTION INTRAMUSCULAR; INTRAVENOUS at 20:44

## 2022-01-09 RX ADMIN — Medication 15 MILLIGRAM(S): at 20:45

## 2022-01-09 RX ADMIN — SODIUM CHLORIDE 1500 MILLILITER(S): 9 INJECTION INTRAMUSCULAR; INTRAVENOUS; SUBCUTANEOUS at 21:24

## 2022-01-09 RX ADMIN — CEFTRIAXONE 100 MILLIGRAM(S): 500 INJECTION, POWDER, FOR SOLUTION INTRAMUSCULAR; INTRAVENOUS at 20:14

## 2022-01-09 RX ADMIN — SODIUM CHLORIDE 1000 MILLILITER(S): 9 INJECTION INTRAMUSCULAR; INTRAVENOUS; SUBCUTANEOUS at 21:23

## 2022-01-09 NOTE — ED PROVIDER NOTE - OBJECTIVE STATEMENT
41 year old with no pmhx presenting to er with urinary symptoms/fever. Sts has had 1 week of dysuria, hematuria, urgency and frequency. Pt dx with uti and placed on Bactrim now on day 6/7. sts now having fever x 2 days tmax 102 with assoc b/l flank pain. + nausea. No alterations in bowel habits, chest pain, sob, vomiting, inability to tolerate po, hx of frequent utis, cough, uri symptoms, hx of kidney stones.

## 2022-01-09 NOTE — ED PROVIDER NOTE - PHYSICAL EXAMINATION
CONSTITUTIONAL: Well-appearing; well-nourished; in no apparent distress.   EYES: PERRL; EOM intact.   ENT: normal nose; no rhinorrhea; normal pharynx with no tonsillar hypertrophy.   NECK: Supple; non-tender; no cervical lymphadenopathy.   CARDIOVASCULAR: + tachycardia no murmurs, rubs, or gallops.   RESPIRATORY: Normal chest excursion with respiration; breath sounds clear and equal bilaterally; no wheezes, rhonchi, or rales.  GI/: Normal bowel sounds; non-distended; + suprapubic discomfort. No rebound or guarding. No cva ttp  MS: No evidence of trauma or deformity.  Normal ROM in all four extremities; non-tender to palpation; distal pulses are normal.   SKIN: Normal for age and race; warm; dry; good turgor; no apparent lesions or exudate.   NEURO/PSYCH: A & O x 4; grossly unremarkable. mood and manner are appropriate. Grooming and personal hygiene are appropriate.

## 2022-01-09 NOTE — ED PROVIDER NOTE - NS ED ROS FT
Constitutional: + fever, chills, body aches  Eyes: no redness/discharge/pain/vision changes  ENT: no rhinorrhea/ear pain/sore throat  Cardiac: No chest pain, SOB or edema.  Respiratory: No cough or respiratory distress  GI: + nausea. No vomiting, diarrhea or abdominal pain.  : + dysuria, frequency, urgency hematuria  MS: no pain to back or extremities, no loss of ROM, no weakness  Neuro: No headache or weakness. No LOC.  Skin: No skin rash.  Endocrine: No history of thyroid disease or diabetes.  Except as documented in the HPI, all other systems are negative.

## 2022-01-09 NOTE — ED PROVIDER NOTE - CLINICAL SUMMARY MEDICAL DECISION MAKING FREE TEXT BOX
41-year-old female presents to the ED for flank pain and fevers.  Patient was recently treated for UTI-like symptoms for the past week and has failed outpatient therapy.  Treated outpatient with Bactrim day 6 out of 7.  Physical exam noted to have bilateral flank pain with tenderness to palpation.  Signs noted to be febrile and tachycardic.  Obtain labs, UA.  Labs revealed mild leukopenia UA negative for nitrates and leuk esterase–results unreliable due to outpatient antibiotic therapy.  Initiated parenteral therapy for clinical pyelonephritis.  Admitted to inpatient service for failure of outpatient therapy.

## 2022-01-10 ENCOUNTER — TRANSCRIPTION ENCOUNTER (OUTPATIENT)
Age: 42
End: 2022-01-10

## 2022-01-10 LAB
A1C WITH ESTIMATED AVERAGE GLUCOSE RESULT: 5.5 % — SIGNIFICANT CHANGE UP (ref 4–5.6)
ALBUMIN SERPL ELPH-MCNC: 4 G/DL — SIGNIFICANT CHANGE UP (ref 3.5–5.2)
ALP SERPL-CCNC: 63 U/L — SIGNIFICANT CHANGE UP (ref 30–115)
ALT FLD-CCNC: 38 U/L — SIGNIFICANT CHANGE UP (ref 0–41)
ANION GAP SERPL CALC-SCNC: 13 MMOL/L — SIGNIFICANT CHANGE UP (ref 7–14)
APTT BLD: 29.9 SEC — SIGNIFICANT CHANGE UP (ref 27–39.2)
AST SERPL-CCNC: 37 U/L — SIGNIFICANT CHANGE UP (ref 0–41)
BASOPHILS # BLD AUTO: 0.02 K/UL — SIGNIFICANT CHANGE UP (ref 0–0.2)
BASOPHILS NFR BLD AUTO: 0.5 % — SIGNIFICANT CHANGE UP (ref 0–1)
BILIRUB SERPL-MCNC: <0.2 MG/DL — SIGNIFICANT CHANGE UP (ref 0.2–1.2)
BUN SERPL-MCNC: 7 MG/DL — LOW (ref 10–20)
CALCIUM SERPL-MCNC: 8.5 MG/DL — SIGNIFICANT CHANGE UP (ref 8.5–10.1)
CHLORIDE SERPL-SCNC: 104 MMOL/L — SIGNIFICANT CHANGE UP (ref 98–110)
CHOLEST SERPL-MCNC: 110 MG/DL — SIGNIFICANT CHANGE UP
CO2 SERPL-SCNC: 21 MMOL/L — SIGNIFICANT CHANGE UP (ref 17–32)
CREAT SERPL-MCNC: 0.6 MG/DL — LOW (ref 0.7–1.5)
EOSINOPHIL # BLD AUTO: 0.29 K/UL — SIGNIFICANT CHANGE UP (ref 0–0.7)
EOSINOPHIL NFR BLD AUTO: 7.8 % — SIGNIFICANT CHANGE UP (ref 0–8)
ESTIMATED AVERAGE GLUCOSE: 111 MG/DL — SIGNIFICANT CHANGE UP (ref 68–114)
GLUCOSE SERPL-MCNC: 116 MG/DL — HIGH (ref 70–99)
HCT VFR BLD CALC: 36.7 % — LOW (ref 37–47)
HDLC SERPL-MCNC: 24 MG/DL — LOW
HGB BLD-MCNC: 11.9 G/DL — LOW (ref 12–16)
IMM GRANULOCYTES NFR BLD AUTO: 0.8 % — HIGH (ref 0.1–0.3)
INR BLD: 1.11 RATIO — SIGNIFICANT CHANGE UP (ref 0.65–1.3)
LIPID PNL WITH DIRECT LDL SERPL: 66 MG/DL — SIGNIFICANT CHANGE UP
LYMPHOCYTES # BLD AUTO: 0.71 K/UL — LOW (ref 1.2–3.4)
LYMPHOCYTES # BLD AUTO: 19 % — LOW (ref 20.5–51.1)
MCHC RBC-ENTMCNC: 26.1 PG — LOW (ref 27–31)
MCHC RBC-ENTMCNC: 32.4 G/DL — SIGNIFICANT CHANGE UP (ref 32–37)
MCV RBC AUTO: 80.5 FL — LOW (ref 81–99)
MONOCYTES # BLD AUTO: 0.4 K/UL — SIGNIFICANT CHANGE UP (ref 0.1–0.6)
MONOCYTES NFR BLD AUTO: 10.7 % — HIGH (ref 1.7–9.3)
NEUTROPHILS # BLD AUTO: 2.29 K/UL — SIGNIFICANT CHANGE UP (ref 1.4–6.5)
NEUTROPHILS NFR BLD AUTO: 61.2 % — SIGNIFICANT CHANGE UP (ref 42.2–75.2)
NON HDL CHOLESTEROL: 86 MG/DL — SIGNIFICANT CHANGE UP
NRBC # BLD: 0 /100 WBCS — SIGNIFICANT CHANGE UP (ref 0–0)
PLATELET # BLD AUTO: 171 K/UL — SIGNIFICANT CHANGE UP (ref 130–400)
POTASSIUM SERPL-MCNC: 3.5 MMOL/L — SIGNIFICANT CHANGE UP (ref 3.5–5)
POTASSIUM SERPL-SCNC: 3.5 MMOL/L — SIGNIFICANT CHANGE UP (ref 3.5–5)
PROT SERPL-MCNC: 6.3 G/DL — SIGNIFICANT CHANGE UP (ref 6–8)
PROTHROM AB SERPL-ACNC: 12.8 SEC — SIGNIFICANT CHANGE UP (ref 9.95–12.87)
RBC # BLD: 4.56 M/UL — SIGNIFICANT CHANGE UP (ref 4.2–5.4)
RBC # FLD: 15.6 % — HIGH (ref 11.5–14.5)
SODIUM SERPL-SCNC: 138 MMOL/L — SIGNIFICANT CHANGE UP (ref 135–146)
TRIGL SERPL-MCNC: 114 MG/DL — SIGNIFICANT CHANGE UP
WBC # BLD: 3.74 K/UL — LOW (ref 4.8–10.8)
WBC # FLD AUTO: 3.74 K/UL — LOW (ref 4.8–10.8)

## 2022-01-10 PROCEDURE — 99222 1ST HOSP IP/OBS MODERATE 55: CPT | Mod: GC

## 2022-01-10 PROCEDURE — 72110 X-RAY EXAM L-2 SPINE 4/>VWS: CPT | Mod: 26

## 2022-01-10 PROCEDURE — 71045 X-RAY EXAM CHEST 1 VIEW: CPT | Mod: 26

## 2022-01-10 RX ORDER — PROPRANOLOL HCL 160 MG
1 CAPSULE, EXTENDED RELEASE 24HR ORAL
Qty: 0 | Refills: 0 | DISCHARGE

## 2022-01-10 RX ORDER — OMEPRAZOLE 10 MG/1
1 CAPSULE, DELAYED RELEASE ORAL
Qty: 0 | Refills: 0 | DISCHARGE

## 2022-01-10 RX ORDER — ONDANSETRON 8 MG/1
4 TABLET, FILM COATED ORAL EVERY 8 HOURS
Refills: 0 | Status: DISCONTINUED | OUTPATIENT
Start: 2022-01-10 | End: 2022-01-11

## 2022-01-10 RX ORDER — LANOLIN ALCOHOL/MO/W.PET/CERES
3 CREAM (GRAM) TOPICAL AT BEDTIME
Refills: 0 | Status: DISCONTINUED | OUTPATIENT
Start: 2022-01-10 | End: 2022-01-11

## 2022-01-10 RX ORDER — ACETAMINOPHEN 500 MG
650 TABLET ORAL EVERY 6 HOURS
Refills: 0 | Status: DISCONTINUED | OUTPATIENT
Start: 2022-01-10 | End: 2022-01-11

## 2022-01-10 RX ORDER — DIPHENHYDRAMINE HCL 50 MG
25 CAPSULE ORAL ONCE
Refills: 0 | Status: COMPLETED | OUTPATIENT
Start: 2022-01-10 | End: 2022-01-10

## 2022-01-10 RX ORDER — CEFPODOXIME PROXETIL 100 MG
1 TABLET ORAL
Qty: 20 | Refills: 0
Start: 2022-01-10 | End: 2022-01-19

## 2022-01-10 RX ORDER — SODIUM CHLORIDE 9 MG/ML
1000 INJECTION, SOLUTION INTRAVENOUS
Refills: 0 | Status: DISCONTINUED | OUTPATIENT
Start: 2022-01-10 | End: 2022-01-10

## 2022-01-10 RX ORDER — KETOROLAC TROMETHAMINE 30 MG/ML
30 SYRINGE (ML) INJECTION ONCE
Refills: 0 | Status: DISCONTINUED | OUTPATIENT
Start: 2022-01-10 | End: 2022-01-10

## 2022-01-10 RX ORDER — CEFTRIAXONE 500 MG/1
2000 INJECTION, POWDER, FOR SOLUTION INTRAMUSCULAR; INTRAVENOUS EVERY 24 HOURS
Refills: 0 | Status: DISCONTINUED | OUTPATIENT
Start: 2022-01-10 | End: 2022-01-10

## 2022-01-10 RX ORDER — CIPROFLOXACIN LACTATE 400MG/40ML
400 VIAL (ML) INTRAVENOUS EVERY 12 HOURS
Refills: 0 | Status: DISCONTINUED | OUTPATIENT
Start: 2022-01-10 | End: 2022-01-11

## 2022-01-10 RX ORDER — METHOCARBAMOL 500 MG/1
500 TABLET, FILM COATED ORAL ONCE
Refills: 0 | Status: COMPLETED | OUTPATIENT
Start: 2022-01-10 | End: 2022-01-10

## 2022-01-10 RX ADMIN — SODIUM CHLORIDE 100 MILLILITER(S): 9 INJECTION, SOLUTION INTRAVENOUS at 08:01

## 2022-01-10 RX ADMIN — Medication 30 MILLILITER(S): at 15:32

## 2022-01-10 RX ADMIN — CEFTRIAXONE 100 MILLIGRAM(S): 500 INJECTION, POWDER, FOR SOLUTION INTRAMUSCULAR; INTRAVENOUS at 07:11

## 2022-01-10 RX ADMIN — METHOCARBAMOL 500 MILLIGRAM(S): 500 TABLET, FILM COATED ORAL at 10:15

## 2022-01-10 RX ADMIN — Medication 30 MILLIGRAM(S): at 01:37

## 2022-01-10 RX ADMIN — Medication 650 MILLIGRAM(S): at 14:36

## 2022-01-10 RX ADMIN — Medication 25 MILLIGRAM(S): at 13:44

## 2022-01-10 RX ADMIN — Medication 200 MILLIGRAM(S): at 18:01

## 2022-01-10 NOTE — DISCHARGE NOTE PROVIDER - NSDCMRMEDTOKEN_GEN_ALL_CORE_FT
cefpodoxime 200 mg oral tablet: 1 tab(s) orally 2 times a day for ten days   propranolol 10 mg oral tablet: 1 tab(s) orally once a day   ciprofloxacin 500 mg oral tablet: 1 tab(s) orally once a day for 4 days   propranolol 10 mg oral tablet: 1 tab(s) orally once a day

## 2022-01-10 NOTE — DISCHARGE NOTE PROVIDER - PROVIDER TOKENS
PROVIDER:[TOKEN:[[id number]],FOLLOWUP:[Routine]] PROVIDER:[TOKEN:[[id number]],FOLLOWUP:[1 week]]

## 2022-01-10 NOTE — H&P ADULT - ATTENDING COMMENTS
HPI:  41 year old with no pmhx presenting today with urinary symptoms/fever. se reports onset of symptoms1 week ago, with dysuria, hematuria, urgency and frequency. Pt was diagnosed  with UTI at [facility] and placed on Bactrim now on day 6/7. she's reporting now having fever for the past 2 days tmax 102 with assoc b/l flank pain, myagia,  nausea. No alterations in bowel habits, chest pain, sob, vomiting, inability to tolerate po, hx of frequent utis, cough, uri symptoms, hx of kidney stones.    in the ED,pt's VS T(C): 36.7 (01-10-22 @ 00:07), Max: 38.7 (01-09-22 @ 18:01)  HR: 85 (01-10-22 @ 00:07) (75 - 113)  BP: 110/59 (01-10-22 @ 00:07) (98/56 - 124/74)  RR: 18 (01-10-22 @ 00:07) (18 - 18)  SpO2: 99% (01-10-22 @ 00:07) (99% - 100%), labs done showed WBC 4K, hb 13, creat 0.6, na 135, UA negative, < from: CT Abdomen and Pelvis w/ IV Cont (01.09.22 @ 23:50) >    No evidence of pyelonephritis. Mild thickening of urinary bladder wall,   correlate for cystitis.    3.0 cm left crenated ovarian cyst.    pt received ceftriaxone, 2.5 L NS.   pt is admitted for treatment of pyelonephritis. (10 Geoff 2022 02:37)    REVIEW OF SYSTEMS: see cc/HPI   CONSTITUTIONAL: No weakness, fevers or chills  EYES/ENT: No visual changes;  No vertigo or throat pain   NECK: No pain or stiffness  RESPIRATORY: No cough, wheezing, hemoptysis; No shortness of breath  CARDIOVASCULAR: No chest pain or palpitations  GASTROINTESTINAL: (+) abdominal and bilateral flank pain. No nausea/vomiting, or hematemesis; No diarrhea or constipation. No melena or hematochezia.  GENITOURINARY: (+) dysuria, (+) frequency (+) delayed emptying of bladder, NO hematuria  NEUROLOGICAL: No numbness or weakness  SKIN: No itching, rashes    Physical Exam:   General: WN/WD NAD  Neurology: A&Ox3, nonfocal, follows commands  Eyes: PERRLA/ EOMI  ENT/Neck: Neck supple, trachea midline, No JVD  Respiratory: CTA B/L, No wheezing, rales, rhonchi  CV: Normal rate regular rhythm, S1S2, no murmurs, rubs or gallops  Abdominal: Soft, ND +BS, (+) diffuse tenderness especially over the suprapubic area and LLQ  Extremities: No edema, + peripheral pulses  Skin: No Rashes, Hematoma, Ecchymosis  Incisions: n/a  Tubes: n/a    A/p  UTI - cystitic on CT /clinically pyelonephritis   -IV abx and IV fluids  -follow up blood and urine cx  -fever and pain control PRN     Ovarian cyst   -outpatient Gyn eval and follow up     DVT prophylaxis

## 2022-01-10 NOTE — CHART NOTE - NSCHARTNOTEFT_GEN_A_CORE
Pt seen and examined at bedside today, plan discussed with attending, Pt received ceftriaxone inpatient and developed an erythematous pruritic rash all over her body. Rocephin dc'd, switched to ciprofloxacin. Pt also complaining of back pain will get Xray LS spine (nothing on CT AP)  F/u UCx  A/c dc tomorrow

## 2022-01-10 NOTE — DISCHARGE NOTE PROVIDER - CARE PROVIDER_API CALL
Onel Kebede (DO)  Medicine  [address] [place] [address], [address] [place] [address]  Phone: [phone]  Fax: [phone]  Follow Up Time: Routine   Jared Villalpando (MD)  Medicine  [address] [place] [address]  Phone: [phone]  Fax: [phone]  Follow Up Time: 1 week

## 2022-01-10 NOTE — H&P ADULT - NSHPPHYSICALEXAM_GEN_ALL_CORE
T(C): 36.7 (01-10-22 @ 00:07), Max: 38.7 (01-09-22 @ 18:01)  HR: 85 (01-10-22 @ 00:07) (75 - 113)  BP: 110/59 (01-10-22 @ 00:07) (98/56 - 124/74)  RR: 18 (01-10-22 @ 00:07) (18 - 18)  SpO2: 99% (01-10-22 @ 00:07) (99% - 100%)    PHYSICAL EXAM:  GENERAL: NAD, well-developed  HEAD:  Atraumatic, Normocephalic  EYES: EOMI, PERRLA, conjunctiva and sclera clear  ENT:No nasal obstruction or discharge. No tonsillar exudate, swelling or erythema.  NECK: Supple, No JVD  CHEST/LUNG: Clear to auscultation bilaterally; No wheeze  HEART: Regular rate and rhythm; No murmurs, rubs, or gallops  ABDOMEN: Soft, Nontender, Nondistended; Bowel sounds present  EXTREMITIES:  2+ Peripheral Pulses, No clubbing, cyanosis, or edema  PSYCH: AAOx3  NEUROLOGY: non-focal  SKIN: No rashes or lesions

## 2022-01-10 NOTE — DISCHARGE NOTE PROVIDER - NSDCCPCAREPLAN_GEN_ALL_CORE_FT
PRINCIPAL DISCHARGE DIAGNOSIS  Diagnosis: Pyelonephritis  Assessment and Plan of Treatment: You were noted either on arrival or during this hospitalization to have a Urinary Tract Infection. You may have already been treated and completed the antibiotics, please refer to the list of medications present on your discharge paperwork. If you notice that there are antibiotics listed, these may be to treat your infection, be sure to complete taking the full course, whether you have symptoms or not, as prescribed.  While taking antibiotics, you may benefit from taking a probiotic such as florastore to help to try and prevent an infectious type of diarrhea known as C Diff. If you notice that you begin having severe watery diarrhea, more than 4-5 episodes a day, please see your Primary Care Doctor or come to the ER to have your stool tested for this infection.   It is not necessary to repeat a urine test to see if the infection is gone, it is assumed that after treatment it should have resolved. However, if you continue to have symptoms, please see your Primary Care doctor or return to the ER.       PRINCIPAL DISCHARGE DIAGNOSIS  Diagnosis: Acute cystitis  Assessment and Plan of Treatment: Please finish taking your antibiotics as prescribed for 4 more days and follow up with your PCP in regard to your other symptoms of weakness.   You were noted either on arrival or during this hospitalization to have a Urinary Tract Infection. You may have already been treated and completed the antibiotics, please refer to the list of medications present on your discharge paperwork. If you notice that there are antibiotics listed, these may be to treat your infection, be sure to complete taking the full course, whether you have symptoms or not, as prescribed.  While taking antibiotics, you may benefit from taking a probiotic such as florastore to help to try and prevent an infectious type of diarrhea known as C Diff. If you notice that you begin having severe watery diarrhea, more than 4-5 episodes a day, please see your Primary Care Doctor or come to the ER to have your stool tested for this infection.   It is not necessary to repeat a urine test to see if the infection is gone, it is assumed that after treatment it should have resolved. However, if you continue to have symptoms, please see your Primary Care doctor or return to the ER.

## 2022-01-10 NOTE — DISCHARGE NOTE NURSING/CASE MANAGEMENT/SOCIAL WORK - PATIENT PORTAL LINK FT
You can access the FollowMyHealth Patient Portal offered by [facility] by registering at the following website: [email or web address]. By joining [employer]’s FollowMyHealth portal, you will also be able to view your health information using other applications (apps) compatible with our system.

## 2022-01-10 NOTE — DISCHARGE NOTE NURSING/CASE MANAGEMENT/SOCIAL WORK - NSDCPEFALRISK_GEN_ALL_CORE
For information on Fall & Injury Prevention, visit: [email or web address] OR  [email or web address] OR  https://www.cdc.gov/steadi/patient.html

## 2022-01-10 NOTE — H&P ADULT - NSHPREVIEWOFSYSTEMS_GEN_ALL_CORE
General: ++ fevers, chills, no weight changes	  Skin/Breast: No skin rashes or wounds  Ophthalmologic: No blurry vision, double vision, recent changes in vision  ENMT: No difficulty hearing, ringing in ears, nasal discharge, throat pain, difficulty swallowing  Respiratory and Thorax: No coughing, wheezing, shortness of breath  Cardiovascular: No chest pain, palpitations  Gastrointestinal: ++abdominal pain, nausea, no constipation, diarrhea, , vomiting  Genitourinary: No dysuria, polyuria, pyuria, hematuria  Musculoskeletal: No muscle aches or joint aches  Neurological: No numbness or tingling  Psychiatric: Regular mood  Hematology/Lymphatics: No easy bruising	  Endocrine: No hot or cold intolerance

## 2022-01-10 NOTE — PROGRESS NOTE ADULT - SUBJECTIVE AND OBJECTIVE BOX
INTERVAL HPI/OVERNIGHT EVENTS:    SUBJECTIVE: Patient seen and examined at bedside.     no cp, sob, abd pain, fever  no abd pain, nausea, vomiting, diarrhea    OBJECTIVE:    VITAL SIGNS:  Vital Signs Last 24 Hrs  T(C): 36.1 (10 Geoff 2022 07:38), Max: 38.7 (2022 18:01)  T(F): 97 (10 Geoff 2022 07:38), Max: 101.7 (2022 18:01)  HR: 76 (10 Geoff 2022 07:38) (75 - 113)  BP: 93/59 (10 Geoff 2022 07:38) (93/59 - 124/74)  BP(mean): --  RR: 18 (10 Geoff 2022 07:38) (18 - 18)  SpO2: 99% (10 Geoff 2022 07:38) (99% - 100%)      PHYSICAL EXAM:    General: NAD  HEENT: NC/AT; PERRL, clear conjunctiva  Neck: supple  Respiratory: CTA b/l  Cardiovascular: +S1/S2; RRR  Abdomen: soft, NT/ND; +BS x4  Extremities: WWP, 2+ peripheral pulses b/l; no LE edema  Skin: macular rash trunk, face  Neurological:    MEDICATIONS:  MEDICATIONS  (STANDING):  ciprofloxacin   IVPB 400 milliGRAM(s) IV Intermittent every 12 hours  propranolol 10 milliGRAM(s) Oral daily    MEDICATIONS  (PRN):  acetaminophen     Tablet .. 650 milliGRAM(s) Oral every 6 hours PRN Temp greater or equal to 38C (100.4F), Mild Pain (1 - 3)  aluminum hydroxide/magnesium hydroxide/simethicone Suspension 30 milliLiter(s) Oral every 4 hours PRN Dyspepsia  melatonin 3 milliGRAM(s) Oral at bedtime PRN Insomnia  ondansetron Injectable 4 milliGRAM(s) IV Push every 8 hours PRN Nausea and/or Vomiting      ALLERGIES:  Allergies    No Known Allergies    Intolerances        LABS:                        11.9   3.74  )-----------( 171      ( 10 Geoff 2022 04:30 )             36.7     Hemoglobin: 11.9 g/dL (01-10 @ 04:30)  Hemoglobin: 13.1 g/dL ( @ 20:00)    CBC Full  -  ( 10 Geoff 2022 04:30 )  WBC Count : 3.74 K/uL  RBC Count : 4.56 M/uL  Hemoglobin : 11.9 g/dL  Hematocrit : 36.7 %  Platelet Count - Automated : 171 K/uL  Mean Cell Volume : 80.5 fL  Mean Cell Hemoglobin : 26.1 pg  Mean Cell Hemoglobin Concentration : 32.4 g/dL  Auto Neutrophil # : 2.29 K/uL  Auto Lymphocyte # : 0.71 K/uL  Auto Monocyte # : 0.40 K/uL  Auto Eosinophil # : 0.29 K/uL  Auto Basophil # : 0.02 K/uL  Auto Neutrophil % : 61.2 %  Auto Lymphocyte % : 19.0 %  Auto Monocyte % : 10.7 %  Auto Eosinophil % : 7.8 %  Auto Basophil % : 0.5 %    01-10    138  |  104  |  7<L>  ----------------------------<  116<H>  3.5   |  21  |  0.6<L>    Ca    8.5      10 Geoff 2022 04:30    TPro  6.3  /  Alb  4.0  /  TBili  <0.2  /  DBili  x   /  AST  37  /  ALT  38  /  AlkPhos  63  10    Creatinine Trend: 0.6<--, 0.6<--  LIVER FUNCTIONS - ( 10 Geoff 2022 04:30 )  Alb: 4.0 g/dL / Pro: 6.3 g/dL / ALK PHOS: 63 U/L / ALT: 38 U/L / AST: 37 U/L / GGT: x           PT/INR - ( 10 Geoff 2022 04:30 )   PT: 12.80 sec;   INR: 1.11 ratio         PTT - ( 10 Geoff 2022 04:30 )  PTT:29.9 sec    hs Troponin:            Urinalysis Basic - ( 2022 20:00 )    Color: Light Yellow / Appearance: Clear / S.017 / pH: x  Gluc: x / Ketone: Small  / Bili: Negative / Urobili: <2 mg/dL   Blood: x / Protein: Trace / Nitrite: Negative   Leuk Esterase: Negative / RBC: 1 /HPF / WBC 1 /HPF   Sq Epi: x / Non Sq Epi: 1 /HPF / Bacteria: Negative      CSF:                      EKG:   MICROBIOLOGY:    IMAGING:      Labs, imaging, EKG personally reviewed    RADIOLOGY & ADDITIONAL TESTS: Reviewed.

## 2022-01-10 NOTE — H&P ADULT - HISTORY OF PRESENT ILLNESS
41 year old with no pmhx presenting today with urinary symptoms/fever. se reports onset of symptoms1 week ago, with dysuria, hematuria, urgency and frequency. Pt was [facility]  [facility] and placed on Bactrim now on day 6/7. she's reporting now having fever for the past 2 days tmax 102 with assoc b/l flank pain, myagia,  nausea. No alterations in bowel habits, chest pain, sob, vomiting, inability to tolerate po, hx of frequent utis, cough, uri symptoms, hx of kidney stones.    in the ED,pt's VS T(C): 36.7 (01-10-22 @ 00:07), Max: 38.7 (01-09-22 @ 18:01)  HR: 85 (01-10-22 @ 00:07) (75 - 113)  BP: 110/59 (01-10-22 @ 00:07) (98/56 - 124/74)  RR: 18 (01-10-22 @ 00:07) (18 - 18)  SpO2: 99% (01-10-22 @ 00:07) (99% - 100%), labs done showed WBC 4K, hb 13, creat 0.6, na 135, UA negative, < from: CT Abdomen and Pelvis w/ IV Cont (01.09.22 @ 23:50) >    No evidence of pyelonephritis. Mild thickening of urinary bladder wall,   correlate for cystitis.    3.0 cm left crenated ovarian cyst.    pt received ceftriaxone, 2.5 L NS.   pt is admitted for treatment of pyelonephritis.

## 2022-01-10 NOTE — DISCHARGE NOTE PROVIDER - HOSPITAL COURSE
41 year old with no pmhx presenting today with urinary symptoms/fever. se reports onset of symptoms1 week ago, with dysuria, hematuria, urgency and frequency. Pt was diagnosed  with UTI at [facility] and placed on Bactrim now on day 6/7. Came in for fevers. admitted for pyelonephritis. CT AP showed cystitis. Received IV fluids and ceftriaxone. Will be discharged on PO vantin. UCx will be followed up. 41 year old with no pmhx presenting today with urinary symptoms/fever. se reports onset of symptoms1 week ago, with dysuria, hematuria, urgency and frequency. Pt was diagnosed  with UTI at [facility] and placed on Bactrim now on day 6/7. Came in for fevers. admitted for pyelonephritis. CT AP showed cystitis. Received IV fluids and ceftriaxone. Pt developed reaction to ceftriaxone, resolved with benadryl. To be d/c'd on cipro for total of 7 days of abx.

## 2022-01-10 NOTE — H&P ADULT - NSHPLABSRESULTS_GEN_ALL_CORE
13.1   4.04  )-----------( 189      ( 2022 20:00 )             39.4           135  |  100  |  8<L>  ----------------------------<  99  4.4   |  19  |  0.6<L>    Ca    9.2      2022 20:00    TPro  7.5  /  Alb  4.6  /  TBili  <0.2  /  DBili  <0.2  /  AST  36  /  ALT  34  /  AlkPhos  70                Urinalysis Basic - ( 2022 20:00 )    Color: Light Yellow / Appearance: Clear / S.017 / pH: x  Gluc: x / Ketone: Small  / Bili: Negative / Urobili: <2 mg/dL   Blood: x / Protein: Trace / Nitrite: Negative   Leuk Esterase: Negative / RBC: 1 /HPF / WBC 1 /HPF   Sq Epi: x / Non Sq Epi: 1 /HPF / Bacteria: Negative            Lactate Trend   @ 20:00 Lactate:1.0             CAPILLARY BLOOD GLUCOSE            < from: CT Abdomen and Pelvis w/ IV Cont (22 @ 23:50) >    IMPRESSION:      No evidence of pyelonephritis. Mild thickening of urinary bladder wall,   correlate for cystitis.    3.0 cm left crenated ovarian cyst.    < end of copied text >

## 2022-01-10 NOTE — H&P ADULT - ASSESSMENT
41 year old with no pmhx presenting today with urinary symptoms/fever. se reports onset of symptoms1 week ago, with dysuria, hematuria, urgency and frequency. Pt was diagnosed  with UTI at [facility] and placed on Bactrim now on day 6/7. she's reporting now having fever for the past 2 days tmax 102 with assoc b/l flank pain, myagia,  nausea.    #UTI  #Fever, pyelonephritis:  -labs done showed WBC 4K, hb 13, creat 0.6, na 135, UA negative.  - CT Abdomen and Pelvis w/ IV Cont (01.09.22 @ 23:50) >No evidence of pyelonephritis. Mild thickening of urinary bladder wall,   correlate for cystitis.3.0 cm left crenated ovarian cyst.  - in the setting of recent UTI , patient on Bactrim, flank pain, presence of systemic symptoms, pt likely has Pyelonephritis.   - ceftriaxone, continue for now.   - Fu Bcx, Ucx.     Activity: As tolerated  Diet: regular  Dispo: Acute  DVT ppx: not needed  Full code

## 2022-01-11 VITALS
DIASTOLIC BLOOD PRESSURE: 59 MMHG | RESPIRATION RATE: 18 BRPM | HEART RATE: 77 BPM | SYSTOLIC BLOOD PRESSURE: 92 MMHG | TEMPERATURE: 97 F

## 2022-01-11 LAB
ALBUMIN SERPL ELPH-MCNC: 4.5 G/DL — SIGNIFICANT CHANGE UP (ref 3.5–5.2)
ALP SERPL-CCNC: 69 U/L — SIGNIFICANT CHANGE UP (ref 30–115)
ALT FLD-CCNC: 52 U/L — HIGH (ref 0–41)
ANION GAP SERPL CALC-SCNC: 15 MMOL/L — HIGH (ref 7–14)
ANISOCYTOSIS BLD QL: SLIGHT — SIGNIFICANT CHANGE UP
AST SERPL-CCNC: 50 U/L — HIGH (ref 0–41)
BASOPHILS # BLD AUTO: 0.05 K/UL — SIGNIFICANT CHANGE UP (ref 0–0.2)
BASOPHILS NFR BLD AUTO: 0.9 % — SIGNIFICANT CHANGE UP (ref 0–1)
BILIRUB SERPL-MCNC: 0.2 MG/DL — SIGNIFICANT CHANGE UP (ref 0.2–1.2)
BUN SERPL-MCNC: 6 MG/DL — LOW (ref 10–20)
BURR CELLS BLD QL SMEAR: PRESENT — SIGNIFICANT CHANGE UP
CALCIUM SERPL-MCNC: 9.1 MG/DL — SIGNIFICANT CHANGE UP (ref 8.5–10.1)
CHLORIDE SERPL-SCNC: 105 MMOL/L — SIGNIFICANT CHANGE UP (ref 98–110)
CO2 SERPL-SCNC: 21 MMOL/L — SIGNIFICANT CHANGE UP (ref 17–32)
CREAT SERPL-MCNC: 0.5 MG/DL — LOW (ref 0.7–1.5)
CULTURE RESULTS: SIGNIFICANT CHANGE UP
EOSINOPHIL # BLD AUTO: 0.5 K/UL — SIGNIFICANT CHANGE UP (ref 0–0.7)
EOSINOPHIL NFR BLD AUTO: 9.6 % — HIGH (ref 0–8)
GIANT PLATELETS BLD QL SMEAR: PRESENT — SIGNIFICANT CHANGE UP
GLUCOSE SERPL-MCNC: 99 MG/DL — SIGNIFICANT CHANGE UP (ref 70–99)
HCT VFR BLD CALC: 41.4 % — SIGNIFICANT CHANGE UP (ref 37–47)
HGB BLD-MCNC: 13.3 G/DL — SIGNIFICANT CHANGE UP (ref 12–16)
LYMPHOCYTES # BLD AUTO: 1.2 K/UL — SIGNIFICANT CHANGE UP (ref 1.2–3.4)
LYMPHOCYTES # BLD AUTO: 22.8 % — SIGNIFICANT CHANGE UP (ref 20.5–51.1)
MAGNESIUM SERPL-MCNC: 2 MG/DL — SIGNIFICANT CHANGE UP (ref 1.8–2.4)
MANUAL SMEAR VERIFICATION: SIGNIFICANT CHANGE UP
MCHC RBC-ENTMCNC: 25.8 PG — LOW (ref 27–31)
MCHC RBC-ENTMCNC: 32.1 G/DL — SIGNIFICANT CHANGE UP (ref 32–37)
MCV RBC AUTO: 80.4 FL — LOW (ref 81–99)
MICROCYTES BLD QL: SLIGHT — SIGNIFICANT CHANGE UP
MONOCYTES # BLD AUTO: 0.28 K/UL — SIGNIFICANT CHANGE UP (ref 0.1–0.6)
MONOCYTES NFR BLD AUTO: 5.3 % — SIGNIFICANT CHANGE UP (ref 1.7–9.3)
NEUTROPHILS # BLD AUTO: 3 K/UL — SIGNIFICANT CHANGE UP (ref 1.4–6.5)
NEUTROPHILS NFR BLD AUTO: 51.7 % — SIGNIFICANT CHANGE UP (ref 42.2–75.2)
NEUTS BAND # BLD: 5.3 % — SIGNIFICANT CHANGE UP (ref 0–6)
OVALOCYTES BLD QL SMEAR: SLIGHT — SIGNIFICANT CHANGE UP
PLAT MORPH BLD: NORMAL — SIGNIFICANT CHANGE UP
PLATELET # BLD AUTO: 208 K/UL — SIGNIFICANT CHANGE UP (ref 130–400)
POIKILOCYTOSIS BLD QL AUTO: SLIGHT — SIGNIFICANT CHANGE UP
POTASSIUM SERPL-MCNC: 4.2 MMOL/L — SIGNIFICANT CHANGE UP (ref 3.5–5)
POTASSIUM SERPL-SCNC: 4.2 MMOL/L — SIGNIFICANT CHANGE UP (ref 3.5–5)
PROT SERPL-MCNC: 7.4 G/DL — SIGNIFICANT CHANGE UP (ref 6–8)
RBC # BLD: 5.15 M/UL — SIGNIFICANT CHANGE UP (ref 4.2–5.4)
RBC # FLD: 15.5 % — HIGH (ref 11.5–14.5)
RBC BLD AUTO: ABNORMAL
SODIUM SERPL-SCNC: 141 MMOL/L — SIGNIFICANT CHANGE UP (ref 135–146)
SPECIMEN SOURCE: SIGNIFICANT CHANGE UP
TOXIC GRANULES BLD QL SMEAR: PRESENT — SIGNIFICANT CHANGE UP
VARIANT LYMPHS # BLD: 4.4 % — SIGNIFICANT CHANGE UP (ref 0–5)
WBC # BLD: 5.26 K/UL — SIGNIFICANT CHANGE UP (ref 4.8–10.8)
WBC # FLD AUTO: 5.26 K/UL — SIGNIFICANT CHANGE UP (ref 4.8–10.8)

## 2022-01-11 PROCEDURE — 99239 HOSP IP/OBS DSCHRG MGMT >30: CPT

## 2022-01-11 RX ORDER — DIPHENHYDRAMINE HCL 50 MG
25 CAPSULE ORAL ONCE
Refills: 0 | Status: COMPLETED | OUTPATIENT
Start: 2022-01-11 | End: 2022-01-11

## 2022-01-11 RX ORDER — POLYETHYLENE GLYCOL 3350 17 G/17G
17 POWDER, FOR SOLUTION ORAL
Refills: 0 | Status: DISCONTINUED | OUTPATIENT
Start: 2022-01-11 | End: 2022-01-11

## 2022-01-11 RX ORDER — CIPROFLOXACIN LACTATE 400MG/40ML
1 VIAL (ML) INTRAVENOUS
Qty: 4 | Refills: 0
Start: 2022-01-11 | End: 2022-01-14

## 2022-01-11 RX ADMIN — Medication 25 MILLIGRAM(S): at 00:36

## 2022-01-11 RX ADMIN — Medication 650 MILLIGRAM(S): at 02:25

## 2022-01-11 RX ADMIN — Medication 200 MILLIGRAM(S): at 06:33

## 2022-01-11 NOTE — PROGRESS NOTE ADULT - TIME BILLING
41F no PMHx here with sepsis/ SIRS present on admission.    #Sepsis/ SIRS, present on admission; presumed due to uti vs. drug reaction  s/p 6 days bactrim, s/p ceftriaxone in ed  +macular rash trunk, face; flushed, pruritic palms  change abx to cipro  monitor eos, cmp  benadryl 50 x1  ua neg  ct abd with cystitis; no pyelo  f/u ucx, bcx  #DVT ppx  low risk, ambulation    #Progress Note Handoff:  Pending (specify):  Consults_________, Tests________, Test Results_______, Other_____bcx, ucx____  Family discussion: d/w pt at [facility] re: treatment plan, primary dx  Disposition: Home___/SNF___/Other________/Unknown at this time___x_____
d/c plan

## 2022-01-11 NOTE — PROGRESS NOTE ADULT - ASSESSMENT
41 year old woman with no pmhx presenting today with urinary symptoms/fever.    # fever 101.7; incr HR and WBC < 4 on admit c/w sepsis 2/2 UTI  UA negative, prob because pt received bactrim at [facility] for UTI  BCx and UCx neg: prob because of recent bactrim  CT Abdomen and Pelvis w/ IV Cont (01.09.22 @ 23:50) >No evidence of pyelonephritis. Mild thickening of urinary bladder wall; 3.0 cm left crenated ovarian cyst.  had rash w/ rocephin - better now  abx: cipro 500mg po q12 - tx for total of 1 wk of cipro and stop  WBC nl now    # tiny LN past neck  would not w/u any further  size is NOT c/w lymphoma  LN are asymp    # Activity: independent    # DVT ppx: ambulate    Dispo: d/c home today

## 2022-01-11 NOTE — MEDICAL STUDENT PROGRESS NOTE(EDUCATION) - SUBJECTIVE AND OBJECTIVE BOX
KEN WILSON 41y Female  MRN#: [id number]   CODE STATUS:  Hospital Day: 3d  Pt is currently admitted with the primary diagnosis of: cystitis    SUBJECTIVE  Overnight events: NOAEN  Subjective complaints: none    Present Today:   - Becky:  No [ x], Yes [   ] : Indication:                                             ----------------------------------------------------------  OBJECTIVE  PAST MEDICAL & SURGICAL HISTORY  No pertinent past medical history    No significant past surgical history                                              -----------------------------------------------------------  ALLERGIES:  No Known Allergies                                            ------------------------------------------------------------  HOME MEDICATIONS  Home Medications:  propranolol 10 mg oral tablet: 1 tab(s) orally once a day (10 Geoff 2022 02:44)                           MEDICATIONS:  STANDING MEDICATIONS  ciprofloxacin   IVPB 400 milliGRAM(s) IV Intermittent every 12 hours  polyethylene glycol 3350 17 Gram(s) Oral two times a day  propranolol 10 milliGRAM(s) Oral daily    PRN MEDICATIONS  acetaminophen     Tablet .. 650 milliGRAM(s) Oral every 6 hours PRN  aluminum hydroxide/magnesium hydroxide/simethicone Suspension 30 milliLiter(s) Oral every 4 hours PRN  melatonin 3 milliGRAM(s) Oral at bedtime PRN  ondansetron Injectable 4 milliGRAM(s) IV Push every 8 hours PRN                                            ------------------------------------------------------------  VITAL SIGNS: Last 24 Hours  T(C): 36.6 (2022 06:04), Max: 36.6 (2022 06:04)  T(F): 97.9 (2022 06:04), Max: 97.9 (2022 06:04)  HR: 72 (2022 06:04) (71 - 72)  BP: 96/60 (2022 06:04) (96/60 - 115/67)  BP(mean): 73 (2022 06:04) (73 - 73)  RR: 18 (2022 06:04) (18 - 18)  SpO2: 100% (2022 06:04) (99% - 100%)                                           --------------------------------------------------------------  LABS:                        13.3   5.26  )-----------( 208      ( 2022 08:26 )             41.4     -    141  |  105  |  6<L>  ----------------------------<  99  4.2   |  21  |  0.5<L>    Ca    9.1      2022 08:26  Mg     2.0         TPro  7.4  /  Alb  4.5  /  TBili  0.2  /  DBili  x   /  AST  50<H>  /  ALT  52<H>  /  AlkPhos  69  01-11    PT/INR - ( 10 Geoff 2022 04:30 )   PT: 12.80 sec;   INR: 1.11 ratio         PTT - ( 10 Geoff 2022 04:30 )  PTT:29.9 sec  Urinalysis Basic - ( 2022 20:00 )    Color: Light Yellow / Appearance: Clear / S.017 / pH: x  Gluc: x / Ketone: Small  / Bili: Negative / Urobili: <2 mg/dL   Blood: x / Protein: Trace / Nitrite: Negative   Leuk Esterase: Negative / RBC: 1 /HPF / WBC 1 /HPF   Sq Epi: x / Non Sq Epi: 1 /HPF / Bacteria: Negative        Culture - Blood (collected 2022 20:00)  Source: .Blood Blood  Preliminary Report (2022 08:02):    No growth to date.    Culture - Blood (collected 2022 20:00)  Source: .Blood Blood  Preliminary Report (2022 08:02):    No growth to date.    Culture - Urine (collected 2022 20:00)  Source: Clean Catch Clean Catch (Midstream)  Final Report (2022 08:03):    <10,000 CFU/mL Normal Urogenital Natalie                                              -------------------------------------------------------------  RADIOLOGY:    Xray Lumbosacral Spine (01.10.22 @ 16:45)  FINDINGS/  IMPRESSION:    4 lumbar type non-rib bearing vertebral bodies with sacralization of L5.   Pedicles are intact. Vertebral body heights are preserved. Trace   retrolisthesis at L4-L5. Degenerative disc disease at L4-L5.      CT Abdomen and Pelvis w/ IV Cont (22 @ 23:50)   IMPRESSION:    No evidence of pyelonephritis. Mild thickening of urinary bladder wall,   correlate for cystitis.    3.0 cm left crenated ovarian cys                                              --------------------------------------------------------------  PHYSICAL EXAM:  General: NAD, Alert AOx4, fatigue, feeling cold,  HEENT: atraumatic, PERRLA, no JVD, neck is supple   LUNGS: clear to ausculation bilaterally  HEART: regular rate and rhythm, S1 and S2 normal, no gallops or murmurs  ABDOMEN: soft, nontender or nondistended, normoactive bowel sounds  EXT: 2+ peripheral pulses, no LE edema  NEURO: alert and oriented  SKIN: no skin lesions or scars, resolution of rash, mild redness remains                                          --------------------------------------------------------------  ASSESSMENT & PLAN  Past medical history and hospital course:  40 yo female with no PMHx presented with sepsis 2/2 to cystitis.    #sepsis 2/2 to cystitis  #drug reaction   - s/p 6 days bactrim given at [facility] for UTI, symptoms were unresolved and presented to the ED here,  - Ceftriaxone given in ED, presented with a drug reaction- +macular rash trunk and face, pruritic palms  - changed abx from ceftriaxone to Ciprofloxacin 400 mg q12   - administered Benadryl 25 mg x 2 doses  - CT abdo () showed no evidence of pyelonephritis, Mild thickening of urinary bladder wall,   correlate for cystitis.  - UA and BCx was negative  - d/c today on ciprofloxacin 500mg for 4 days, f/u with Dr. Villalpando (PCP)     # fatigue  - pt reports feeling fatigued for the last 6 months, also feeling cold and lost 12 pounds in last 6 months  - f/u with PCP, check TSH, thyroid US                                                                      ----------------------------------------------------  # DVT prophylaxis: none  # GI prophylaxis: none  # Diet: regular  # Activity: AAT  # Code status: full  # Disposition: d/c today to home                                                                           --------------------------------------------------------  # Handoff: d/c today KEN WILSON 41y Female  MRN#: [id number]   CODE STATUS:  Hospital Day: 3d  Pt is currently admitted with the primary diagnosis of: cystitis    SUBJECTIVE  Overnight events: NOAEN  Subjective complaints: none    Present Today:   - Becky:  No [ x], Yes [   ] : Indication:                                             ----------------------------------------------------------  OBJECTIVE  PAST MEDICAL & SURGICAL HISTORY  No pertinent past medical history    No significant past surgical history                                              -----------------------------------------------------------  ALLERGIES:  No Known Allergies                                            ------------------------------------------------------------  HOME MEDICATIONS  Home Medications:  propranolol 10 mg oral tablet: 1 tab(s) orally once a day (10 Geoff 2022 02:44)                           MEDICATIONS:  STANDING MEDICATIONS  ciprofloxacin   IVPB 400 milliGRAM(s) IV Intermittent every 12 hours  polyethylene glycol 3350 17 Gram(s) Oral two times a day  propranolol 10 milliGRAM(s) Oral daily    PRN MEDICATIONS  acetaminophen     Tablet .. 650 milliGRAM(s) Oral every 6 hours PRN  aluminum hydroxide/magnesium hydroxide/simethicone Suspension 30 milliLiter(s) Oral every 4 hours PRN  melatonin 3 milliGRAM(s) Oral at bedtime PRN  ondansetron Injectable 4 milliGRAM(s) IV Push every 8 hours PRN                                            ------------------------------------------------------------  VITAL SIGNS: Last 24 Hours  T(C): 36.6 (2022 06:04), Max: 36.6 (2022 06:04)  T(F): 97.9 (2022 06:04), Max: 97.9 (2022 06:04)  HR: 72 (2022 06:04) (71 - 72)  BP: 96/60 (2022 06:04) (96/60 - 115/67)  BP(mean): 73 (2022 06:04) (73 - 73)  RR: 18 (2022 06:04) (18 - 18)  SpO2: 100% (2022 06:04) (99% - 100%)                                           --------------------------------------------------------------  LABS:                        13.3   5.26  )-----------( 208      ( 2022 08:26 )             41.4     -    141  |  105  |  6<L>  ----------------------------<  99  4.2   |  21  |  0.5<L>    Ca    9.1      2022 08:26  Mg     2.0         TPro  7.4  /  Alb  4.5  /  TBili  0.2  /  DBili  x   /  AST  50<H>  /  ALT  52<H>  /  AlkPhos  69  01-11    PT/INR - ( 10 Geoff 2022 04:30 )   PT: 12.80 sec;   INR: 1.11 ratio         PTT - ( 10 Geoff 2022 04:30 )  PTT:29.9 sec  Urinalysis Basic - ( 2022 20:00 )    Color: Light Yellow / Appearance: Clear / S.017 / pH: x  Gluc: x / Ketone: Small  / Bili: Negative / Urobili: <2 mg/dL   Blood: x / Protein: Trace / Nitrite: Negative   Leuk Esterase: Negative / RBC: 1 /HPF / WBC 1 /HPF   Sq Epi: x / Non Sq Epi: 1 /HPF / Bacteria: Negative        Culture - Blood (collected 2022 20:00)  Source: .Blood Blood  Preliminary Report (2022 08:02):    No growth to date.    Culture - Blood (collected 2022 20:00)  Source: .Blood Blood  Preliminary Report (2022 08:02):    No growth to date.    Culture - Urine (collected 2022 20:00)  Source: Clean Catch Clean Catch (Midstream)  Final Report (2022 08:03):    <10,000 CFU/mL Normal Urogenital Natalie                                              -------------------------------------------------------------  RADIOLOGY:    Xray Lumbosacral Spine (01.10.22 @ 16:45)  FINDINGS/  IMPRESSION:    4 lumbar type non-rib bearing vertebral bodies with sacralization of L5.   Pedicles are intact. Vertebral body heights are preserved. Trace   retrolisthesis at L4-L5. Degenerative disc disease at L4-L5.      CT Abdomen and Pelvis w/ IV Cont (22 @ 23:50)   IMPRESSION:    No evidence of pyelonephritis. Mild thickening of urinary bladder wall,   correlate for cystitis.    3.0 cm left crenated ovarian cys                                              --------------------------------------------------------------  PHYSICAL EXAM:  General: NAD, Alert AOx4, fatigue, feeling cold,  HEENT: atraumatic, PERRLA, no JVD, neck is supple   LUNGS: clear to ausculation bilaterally  HEART: regular rate and rhythm, S1 and S2 normal, no gallops or murmurs  ABDOMEN: soft, nontender or nondistended, normoactive bowel sounds  EXT: 2+ peripheral pulses, no LE edema  NEURO: alert and oriented  SKIN: no skin lesions or scars, resolution of rash, mild redness remains                                          --------------------------------------------------------------  ASSESSMENT & PLAN  Past medical history and hospital course:  40 yo female with no PMHx presented with sepsis 2/2 to cystitis.    #sepsis 2/2 to cystitis  #drug reaction   - s/p 6 days bactrim given at [facility] for UTI, symptoms were unresolved and presented to the ED here,  - Ceftriaxone given in ED, presented with a drug reaction- +macular rash trunk and face, pruritic palms  - changed abx from ceftriaxone to Ciprofloxacin 400 mg q12   - administered Benadryl 25 mg x 2 doses  - CT abdo () showed no evidence of pyelonephritis, Mild thickening of urinary bladder wall,   correlate for cystitis.  - UA and BCx was negative  - d/c today on ciprofloxacin 500mg for 4 days, f/u with Dr. Villalpando (PCP)     #LBP  - pt complained of LBP and a lumbosacral spine x ray was done 12/10:  4 lumbar type non-rib bearing vertebral bodies with sacralization of L5. Pedicles are intact. Vertebral body heights are preserved. Trace retrolisthesis at L4-L5. Degenerative disc disease at L4-L5.  - no CVA tenderness on PE ()      # fatigue  - pt reports feeling fatigued for the last 6 months, also feeling cold and lost 12 pounds in last 6 months  - f/u with PCP, check TSH, thyroid US                                                                      ----------------------------------------------------  # DVT prophylaxis: none  # GI prophylaxis: none  # Diet: regular  # Activity: AAT  # Code status: full  # Disposition: d/c today to home                                                                           --------------------------------------------------------  # Handoff: d/c today KEN WILSON 41y Female  MRN#: [id number]     Hospital Day: 3d  Pt is currently admitted with the primary diagnosis of: cystitis    SUBJECTIVE  No events overnight. Pt seen and examined today. Still having burning with urination, endorses being tired but this has been going on for about a year.                                             ----------------------------------------------------------  OBJECTIVE  PAST MEDICAL & SURGICAL HISTORY  No pertinent past medical history    No significant past surgical history                                              -----------------------------------------------------------  ALLERGIES:  No Known Allergies                                            ------------------------------------------------------------  HOME MEDICATIONS  Home Medications:  propranolol 10 mg oral tablet: 1 tab(s) orally once a day (10 Geoff 2022 02:44)                           MEDICATIONS:  STANDING MEDICATIONS  ciprofloxacin   IVPB 400 milliGRAM(s) IV Intermittent every 12 hours  polyethylene glycol 3350 17 Gram(s) Oral two times a day  propranolol 10 milliGRAM(s) Oral daily    PRN MEDICATIONS  acetaminophen     Tablet .. 650 milliGRAM(s) Oral every 6 hours PRN  aluminum hydroxide/magnesium hydroxide/simethicone Suspension 30 milliLiter(s) Oral every 4 hours PRN  melatonin 3 milliGRAM(s) Oral at bedtime PRN  ondansetron Injectable 4 milliGRAM(s) IV Push every 8 hours PRN                                            ------------------------------------------------------------  VITAL SIGNS: Last 24 Hours  T(C): 36.6 (2022 06:04), Max: 36.6 (2022 06:04)  T(F): 97.9 (2022 06:04), Max: 97.9 (2022 06:04)  HR: 72 (2022 06:04) (71 - 72)  BP: 96/60 (2022 06:04) (96/60 - 115/67)  BP(mean): 73 (2022 06:04) (73 - 73)  RR: 18 (2022 06:04) (18 - 18)  SpO2: 100% (2022 06:04) (99% - 100%)                                           --------------------------------------------------------------  LABS:                        13.3   5.26  )-----------( 208      ( 2022 08:26 )             41.4     -    141  |  105  |  6<L>  ----------------------------<  99  4.2   |  21  |  0.5<L>    Ca    9.1      2022 08:26  Mg     2.0         TPro  7.4  /  Alb  4.5  /  TBili  0.2  /  DBili  x   /  AST  50<H>  /  ALT  52<H>  /  AlkPhos  69  01-11    PT/INR - ( 10 Geoff 2022 04:30 )   PT: 12.80 sec;   INR: 1.11 ratio         PTT - ( 10 Geoff 2022 04:30 )  PTT:29.9 sec  Urinalysis Basic - ( 2022 20:00 )    Color: Light Yellow / Appearance: Clear / S.017 / pH: x  Gluc: x / Ketone: Small  / Bili: Negative / Urobili: <2 mg/dL   Blood: x / Protein: Trace / Nitrite: Negative   Leuk Esterase: Negative / RBC: 1 /HPF / WBC 1 /HPF   Sq Epi: x / Non Sq Epi: 1 /HPF / Bacteria: Negative        Culture - Blood (collected 2022 20:00)  Source: .Blood Blood  Preliminary Report (2022 08:02):    No growth to date.    Culture - Blood (collected 2022 20:00)  Source: .Blood Blood  Preliminary Report (2022 08:02):    No growth to date.    Culture - Urine (collected 2022 20:00)  Source: Clean Catch Clean Catch (Midstream)  Final Report (2022 08:03):    <10,000 CFU/mL Normal Urogenital Natalie                                              -------------------------------------------------------------  RADIOLOGY:    Xray Lumbosacral Spine (01.10.22 @ 16:45)  FINDINGS/  IMPRESSION:    4 lumbar type non-rib bearing vertebral bodies with sacralization of L5.   Pedicles are intact. Vertebral body heights are preserved. Trace   retrolisthesis at L4-L5. Degenerative disc disease at L4-L5.      CT Abdomen and Pelvis w/ IV Cont (22 @ 23:50)   IMPRESSION:    No evidence of pyelonephritis. Mild thickening of urinary bladder wall,   correlate for cystitis.    3.0 cm left crenated ovarian cys                                              --------------------------------------------------------------  PHYSICAL EXAM:  General: NAD, Alert AOx4, fatigue, feeling cold,  HEENT: atraumatic, PERRLA, no JVD, neck is supple   LUNGS: clear to ausculation bilaterally  HEART: regular rate and rhythm, S1 and S2 normal, no gallops or murmurs  ABDOMEN: soft, nontender or nondistended, normoactive bowel sounds  EXT: 2+ peripheral pulses, no LE edema  NEURO: alert and oriented  SKIN: no skin lesions or scars, resolution of rash                                         --------------------------------------------------------------  ASSESSMENT & PLAN    42 yo female with no PMHx presented with sepsis 2/2 to cystitis.    #sepsis 2/2 to cystitis  #drug reaction   - s/p 6 days bactrim given at [facility] for UTI, symptoms were unresolved and presented to the ED here,  - Ceftriaxone given in ED, developed drug reaction- +macular rash trunk and face, pruritic palms, rash now resolved with benadryl 25 x 2 doses  - changed abx from ceftriaxone to Ciprofloxacin 400 mg q12, will switch to PO and plan for total of 7 days of abx on discharge   - CT abdo () showed no evidence of pyelonephritis, Mild thickening of urinary bladder wall, correlate for cystitis.  - UA and BCx was negative  - d/c today on ciprofloxacin 500mg for 4 days, f/u with Dr. Villalpando (PCP)     #lower back pain  - pt complained of LBP and a lumbosacral spine x ray was done 1/10:  some degenerative disease in lumbar spine.  - no CVA tenderness on exam ()    # fatigue  - pt reports feeling fatigued for the last 6 months, also feeling cold and lost 12 pounds in last 6 months  - f/u with PCP, likely thyroid w/u                                                                       ----------------------------------------------------  # Diet: regular  # Activity: AAT  # Code status: full  # Disposition: d/c today to home
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
## 2022-01-11 NOTE — PATIENT PROFILE ADULT - FALL HARM RISK - UNIVERSAL INTERVENTIONS
Bed in lowest position, wheels locked, appropriate side rails in place/Call bell, personal items and telephone in reach/Instruct patient to call for assistance before getting out of bed or chair/Non-slip footwear when patient is out of bed/[place] to call system/Physically safe environment - no spills, clutter or unnecessary equipment/Purposeful Proactive Rounding/Room/bathroom lighting operational, light cord in reach

## 2022-01-11 NOTE — PROGRESS NOTE ADULT - SUBJECTIVE AND OBJECTIVE BOX
STEVEKEN  41y  Female  ***My note supersedes ALL resident notes that I sign.  My corrections for their notes are in my note.***    I can be reached directly on [employer]. My office number is [phone]. My personal cell number is [phone].    INTERVAL EVENTS: Here for f/u of dysuria. Pt says she is feeling a little better today, but still tired. Pt can eat/drink and walk fine on her own.    T(F): 97 (22 @ 13:01), Max: 97.9 (22 @ 06:04)  HR: 77 (22 @ 13:01) (71 - 77)  BP: 92/59 (22 @ 13:01) (92/59 - 115/67)  RR: 18 (22 @ 13:01) (18 - 18)  SpO2: 100% (22 @ 06:04) (99% - 100%)    Gen: NAD  HEENT: PERRL, EOMI, mouth clr, nose clr  Neck: post neck, mostly on rt are tiny, shotty LN (< 0.5cm in size - no tender) - likely WNL (hair line looks fine, no skin infection noted around area), no JVD, thyroid nl  lungs: clr  hrt: s1 s2 rrr no murmur  abd: soft, NT/ND, no HS megaly  ext: no edema, no c/c  neuro: aa ox3, cn intact, can move all 4 ext    LABS:                      13.3    (    80.4   5.26  )-----------( ---------      208      ( 2022 08:26 )             41.4    (    15.5     141   (   105   (   99      22 @ 08:26  ----------------------               4.2   (   21   (   6                             -----                        0.5  Ca  9.1   Mg  2.0    P   --     LFT  7.4  (  0.2  (  50       22 @ 08:26  -------------------------  4.5  (  69  (  52    PT/INR - ( 10 Geoff 2022 04:30 )   PT: 12.80 sec;   INR: 1.11 ratio    PTT - ( 10 Geoff 2022 04:30 )  PTT: 29.9 sec    Urinalysis Basic - ( 2022 20:00 )    Color: Light Yellow / Appearance: Clear / S.017 / pH: x  Gluc: x / Ketone: Small  / Bili: Negative / Urobili: <2 mg/dL   Blood: x / Protein: Trace / Nitrite: Negative   Leuk Esterase: Negative / RBC: 1 /HPF / WBC 1 /HPF   Sq Epi: x / Non Sq Epi: 1 /HPF / Bacteria: Negative    Culture - Blood (collected 22 @ 20:00)  Source: .Blood Blood  Preliminary Report (22 @ 08:02):    No growth to date.    Culture - Blood (collected 22 @ 20:00)  Source: .Blood Blood  Preliminary Report (22 @ 08:02):    No growth to date.    Culture - Urine (collected 22 @ 20:00)  Source: Clean Catch Clean Catch (Midstream)  Final Report (22 @ 08:03):    <10,000 CFU/mL Normal Urogenital Natalie    RADIOLOGY & ADDITIONAL TESTS:  < from: Xray Chest 1 View- PORTABLE-Routine (Xray Chest 1 View- PORTABLE-Routine .) (01.10.22 @ 16:45) >  Impression:    No radiographic evidence of cardiopulmonary disease.    < end of copied text >    < from: Xray Lumbosacral Spine (01.10.22 @ 16:45) >  IMPRESSION:    4 lumbar type non-rib bearing vertebral bodies with sacralization of L5.   Pedicles are intact. Vertebral body heights are preserved. Trace   retrolisthesis at L4-L5. Degenerative disc disease at L4-L5.    < end of copied text >    < from: CT Abdomen and Pelvis w/ IV Cont (22 @ 23:50) >  IMPRESSION:    No evidence of pyelonephritis. Mild thickening of urinary bladder wall, correlate for cystitis.    3.0 cm left crenated ovarian cyst.    < end of copied text >    MEDICATIONS:  ciprofloxacin   IVPB 400 milliGRAM(s) IV Intermittent every 12 hours    acetaminophen     Tablet .. 650 milliGRAM(s) Oral every 6 hours PRN  aluminum hydroxide/magnesium hydroxide/simethicone Suspension 30 milliLiter(s) Oral every 4 hours PRN  melatonin 3 milliGRAM(s) Oral at bedtime PRN  ondansetron Injectable 4 milliGRAM(s) IV Push every 8 hours PRN  polyethylene glycol 3350 17 Gram(s) Oral two times a day  propranolol 10 milliGRAM(s) Oral daily

## 2022-01-12 LAB — T PALLIDUM AB TITR SER: NEGATIVE — SIGNIFICANT CHANGE UP

## 2022-01-15 LAB
CULTURE RESULTS: SIGNIFICANT CHANGE UP
CULTURE RESULTS: SIGNIFICANT CHANGE UP
SPECIMEN SOURCE: SIGNIFICANT CHANGE UP
SPECIMEN SOURCE: SIGNIFICANT CHANGE UP

## 2022-01-16 DIAGNOSIS — N12 TUBULO-INTERSTITIAL NEPHRITIS, NOT SPECIFIED AS ACUTE OR CHRONIC: ICD-10-CM

## 2022-01-16 DIAGNOSIS — N83.202 UNSPECIFIED OVARIAN CYST, LEFT SIDE: ICD-10-CM

## 2022-01-16 DIAGNOSIS — A41.9 SEPSIS, UNSPECIFIED ORGANISM: ICD-10-CM

## 2022-01-16 DIAGNOSIS — N30.00 ACUTE CYSTITIS WITHOUT HEMATURIA: ICD-10-CM

## 2022-03-12 NOTE — ED ADULT NURSE NOTE - NSSEPSISCRITERIAMET_ED_A_ED
Takes cyclosporine 250 mg BID (100 mg 2 pills, and 25mg 2 pills). Cyclosporine level at admission <10    - KTM/KTS eval, appreciate recs  - cyclosporine level daily     Abnormal VS & WBC

## 2022-04-14 PROBLEM — Z00.00 ENCOUNTER FOR PREVENTIVE HEALTH EXAMINATION: Status: ACTIVE | Noted: 2022-04-14

## 2022-04-29 ENCOUNTER — OUTPATIENT (OUTPATIENT)
Dept: OUTPATIENT SERVICES | Facility: HOSPITAL | Age: 42
LOS: 1 days | Discharge: HOME | End: 2022-04-29
Payer: COMMERCIAL

## 2022-04-29 DIAGNOSIS — N63.20 UNSPECIFIED LUMP IN THE LEFT BREAST, UNSPECIFIED QUADRANT: ICD-10-CM

## 2022-04-29 PROCEDURE — 77066 DX MAMMO INCL CAD BI: CPT | Mod: 26

## 2022-04-29 PROCEDURE — 77062 BREAST TOMOSYNTHESIS BI: CPT | Mod: 26

## 2022-04-29 PROCEDURE — 76641 ULTRASOUND BREAST COMPLETE: CPT | Mod: 26,50

## 2022-04-29 PROCEDURE — G0279: CPT | Mod: 26

## 2022-06-03 ENCOUNTER — OUTPATIENT (OUTPATIENT)
Dept: OUTPATIENT SERVICES | Facility: HOSPITAL | Age: 42
LOS: 1 days | Discharge: HOME | End: 2022-06-03
Payer: COMMERCIAL

## 2022-06-03 DIAGNOSIS — R10.2 PELVIC AND PERINEAL PAIN: ICD-10-CM

## 2022-06-03 PROCEDURE — 72197 MRI PELVIS W/O & W/DYE: CPT | Mod: 26

## 2022-07-18 ENCOUNTER — APPOINTMENT (OUTPATIENT)
Dept: NEUROLOGY | Facility: CLINIC | Age: 42
End: 2022-07-18

## 2022-08-15 ENCOUNTER — APPOINTMENT (OUTPATIENT)
Dept: NEUROLOGY | Facility: CLINIC | Age: 42
End: 2022-08-15
Payer: COMMERCIAL

## 2022-08-15 VITALS
BODY MASS INDEX: 23 KG/M2 | DIASTOLIC BLOOD PRESSURE: 74 MMHG | SYSTOLIC BLOOD PRESSURE: 109 MMHG | WEIGHT: 125 LBS | HEIGHT: 62 IN | HEART RATE: 67 BPM

## 2022-08-15 PROCEDURE — 99213 OFFICE O/P EST LOW 20 MIN: CPT

## 2022-08-15 NOTE — ASSESSMENT
[FreeTextEntry1] : With respect to the patient's persistent and worsening neck pain with radiation into the upper extremities in the occipital area despite physical therapy and anti-inflammatories she was referred to have MRI of the cervical spine as well as EMGs of the upper extremities to evaluate for cervical radiculopathy as well as the possibility of a polyneuropathy due to the numbness in her hands and dropping things.  She was advised to continue with physical therapy in the meantime and start a trial of meloxicam.  We will hold off on any muscle relaxers as she already feels tired and sometimes has dizziness which is why she did not tolerate the gabapentin she was given.\par \par With respect to her worsening headaches and the evolving character thereof she was referred to have MRI of the brain without contrast to evaluate for the possibility of intracranial lesion.\par We will follow-up with her as soon as diagnostic testing is done.\par Khushboo Boykin, R,PA,C \par Julian Pineda, \par

## 2022-08-15 NOTE — HISTORY OF PRESENT ILLNESS
[FreeTextEntry1] : Patient is a 41 year old woman seen in the office today in  neurological follow-up regarding  complaints of chronic headaches. She was last seen in in 2015 for the same issue however, the characteristic of her headaches have changed. Patient states having left-sided tenderness at the top of her head and temporal region. She has pressure like of the frontal/temporal region as well. The severity and frequency has increased. She takes 2 Tylenol a week. She tries to avoid taking so much medication. At last visit, she was given Imitrex to trial which helped with her headaches however, had chest pain. She notes, mother being diagnosed with temporal arteritis. In addition, she complains of neck pain. The neck pain radiates down into upper extremities. She reports previously being told of having spondylosis in the neck )over 10 years ago . She reports the neck pain is getting much worse with radication to the upper extremeties and the head with numbness and aching punctuated by sharp pain . Sometimes she drops things .\par \par The headaches begin in the occipital area , radiate to the vertex and the eyes which is characterized by a deep aching and very constrictied feeling . She sometimes have to stay home from work .  \par She is in physical therapy but is not helping yet. She has had no diagnostic testing since this began to worsen . This has been getting progressively worse since she went to the [facility] about a month ago . \par  Graft Cartilage Fenestration Text: The cartilage was fenestrated with a 2mm punch biopsy to help facilitate graft survival and healing.

## 2022-08-15 NOTE — PHYSICAL EXAM
[FreeTextEntry1] : Patient was alert and oriented to person time and place, she was coherent relevant and appropriate.\par She is well-developed well-nourished and in no acute distress.  Examination of the neck reveals decreased range of motion on lateral rotation as well as head tilt.  There was palpable muscular spasm bilaterally in the cervical trapezius with negative bilateral Spurling sign.  There was normal bulk tone and power throughout with reflexes +2 and bilaterally upper and lower.  Gait revealed normal base and stride, normal tandem and negative Romberg.

## 2022-08-19 ENCOUNTER — APPOINTMENT (OUTPATIENT)
Dept: NEUROLOGY | Facility: CLINIC | Age: 42
End: 2022-08-19

## 2022-08-29 ENCOUNTER — TRANSCRIPTION ENCOUNTER (OUTPATIENT)
Age: 42
End: 2022-08-29

## 2022-09-26 NOTE — PATIENT PROFILE ADULT - CAREGIVER PHONE NUMBER
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  The Inderal , medically very tired and fatigued.  So we will cut the capsule in half.\par No palpitations no chest pain, no change in mood.  No syncope, no passing out.
[phone]

## 2022-10-03 ENCOUNTER — APPOINTMENT (OUTPATIENT)
Dept: NEUROLOGY | Facility: CLINIC | Age: 42
End: 2022-10-03
Payer: COMMERCIAL

## 2022-10-03 VITALS
SYSTOLIC BLOOD PRESSURE: 119 MMHG | HEIGHT: 62 IN | WEIGHT: 126 LBS | BODY MASS INDEX: 23.19 KG/M2 | HEART RATE: 71 BPM | DIASTOLIC BLOOD PRESSURE: 80 MMHG

## 2022-10-03 DIAGNOSIS — M47.22 OTHER SPONDYLOSIS WITH RADICULOPATHY, CERVICAL REGION: ICD-10-CM

## 2022-10-03 DIAGNOSIS — R51.9 HEADACHE, UNSPECIFIED: ICD-10-CM

## 2022-10-03 PROCEDURE — 99213 OFFICE O/P EST LOW 20 MIN: CPT

## 2022-10-03 RX ORDER — GABAPENTIN 300 MG/1
300 CAPSULE ORAL
Qty: 90 | Refills: 2 | Status: ACTIVE | COMMUNITY
Start: 2022-10-03 | End: 1900-01-01

## 2022-10-03 NOTE — HISTORY OF PRESENT ILLNESS
[FreeTextEntry1] : \par Patient is a 41 year old woman seen in the office today in neurological follow-up regarding complaints of chronic headaches.  Patient states having left-sided tenderness at the top of her head and temporal region. She has pressure like of the frontal/temporal region as well. The severity and frequency has increased. She takes 2 Tylenol a week. She notes, mother being diagnosed with temporal arteritis. In addition, she complains of neck pain. The neck pain radiates down into upper extremities. She reports previously being told of having spondylosis in the neck.  She was referred for therapy but did not did not get to begin yet and she was referred for MRI of the cervical spine which was denied until she has completed a course of conservative therapy.  She did find the Mobic very helpful in regard to her neck.\par With respect to her sleep she wakes up several times a night she is not sure why and reports tremendous daytime fatigue during which if she sits down she feels as though she could fall asleep at any time.\par \par Medication trials : imitrex d/c due to chest tightness \par 08/31/2022 MRI BRAIN : Minimal subcortical areas of increased signal intensity representing a nonspecific finding meeting the criteria for migraine headache.\par Minimal right sphenoid and maxillary sinusitis.

## 2022-10-03 NOTE — PHYSICAL EXAM
[FreeTextEntry1] : Patient is a 41-year-old woman who appears her stated age, well-developed well-nourished and in no acute distress.  She was alert and oriented coherent relevant and appropriate.

## 2022-10-03 NOTE — ASSESSMENT
[FreeTextEntry1] : With respect to the patient's sleep disturbance and that she wakes up many times during the night and is constantly tired during the day and could fall asleep if she sits down she was referred to have at home testing for sleep apnea.\par With respect to cervical radicular symptomatology which persists she was referred for physical therapy.  She was also advised to start on a trial of gabapentin which may help in regard to her headaches as well.  We will follow-up with her in 4 to 6 weeks and if she is not doing much better will recommend imaging of the cervical spine.\par Was given titration and instructions for the gabapentin and advised to do so as tolerated.  We did discuss the risks and benefits as well as potential side effects.\par \par \par Khushboo Boykin R,PA,C \par uJlian Pineda, DO\par

## 2022-10-12 ENCOUNTER — RX RENEWAL (OUTPATIENT)
Age: 42
End: 2022-10-12

## 2022-10-12 RX ORDER — MELOXICAM 15 MG/1
15 TABLET ORAL
Qty: 30 | Refills: 1 | Status: ACTIVE | COMMUNITY
Start: 2022-08-15 | End: 1900-01-01

## 2022-11-15 ENCOUNTER — APPOINTMENT (OUTPATIENT)
Dept: NEUROLOGY | Facility: CLINIC | Age: 42
End: 2022-11-15

## 2022-11-25 ENCOUNTER — APPOINTMENT (OUTPATIENT)
Dept: PEDIATRIC ALLERGY IMMUNOLOGY | Facility: CLINIC | Age: 42
End: 2022-11-25

## 2022-11-25 VITALS
BODY MASS INDEX: 22.63 KG/M2 | SYSTOLIC BLOOD PRESSURE: 100 MMHG | HEIGHT: 62 IN | DIASTOLIC BLOOD PRESSURE: 70 MMHG | WEIGHT: 123 LBS

## 2022-11-25 DIAGNOSIS — J30.89 OTHER ALLERGIC RHINITIS: ICD-10-CM

## 2022-11-25 DIAGNOSIS — J30.2 OTHER ALLERGIC RHINITIS: ICD-10-CM

## 2022-11-25 PROCEDURE — 95004 PERQ TESTS W/ALRGNC XTRCS: CPT

## 2022-11-25 PROCEDURE — 99203 OFFICE O/P NEW LOW 30 MIN: CPT | Mod: 25

## 2022-11-25 PROCEDURE — 95024 IQ TESTS W/ALLERGENIC XTRCS: CPT

## 2022-11-25 NOTE — SOCIAL HISTORY
[Apartment] : [unfilled] lives in an apartment  [Central Forced Air] : heating provided by central forced air [Central] : air conditioning provided by central unit [Dry] : dry [None] : none [Humidifier] : does not use a humidifier [Dehumidifier] : does not use a dehumidifier [Cockroaches] : Patient states that there are no cockroaches in the home [Dust Mite Covers] : does not have dust mite covers [Feather Pillows] : does not have feather pillows [Feather Comforter] : does not have a feather comforter [Bedroom] : not in the bedroom [Basement] : not in the basement [Living Area] : not in the living area [Smokers in Household] : there are no smokers in the home

## 2022-11-25 NOTE — HISTORY OF PRESENT ILLNESS
[None] : The patient is currently asymptomatic [de-identified] : KEN WILSON is a 42 year  old. 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 In March of 2020 she got COVID and recovered. She states she feels like she never got back to normal. She gets easily short winded. She states that since then she also gets sinus infections more frequently (this year she had it 3 times, each time requiring antibiotics). This past August she had severe congestion, sneezing, runny nose, sinus pressure and headache. She states this year her symptom have gotten significantly worse now effecting her quality of life and work. \par \par She states she also gets random red blotchy, itchy skin. She is unaware of the source, and cannot pin point any correlation to any foods she is eating or anything she is coming in contact with. No joint pains or swelling. \par \par She is here for evaluation.

## 2022-11-25 NOTE — REVIEW OF SYSTEMS
[Rhinorrhea] : rhinorrhea [Nasal Congestion] : nasal congestion [Nasal Itching] : nasal itching [Sneezing] : sneezing [SOB with Exertion] : dyspnea on exertion [Pruritus] : pruritus [Nl] : Genitourinary

## 2022-11-25 NOTE — ASSESSMENT
[FreeTextEntry1] : The patient is a 42 year female with a history suggestive of Allergic Rhinitis. Allergy skin testing showed no significant positive reactions to environmental allergens, discussed avoidance measures including dust mite proof encasings and HEPA air purifier. I also discussed using oral antihistamines for symptomatic relief. i will see her in March to assess. \par

## 2022-11-25 NOTE — PHYSICAL EXAM
[Alert] : alert [Well Nourished] : well nourished [Healthy Appearance] : healthy appearance [No Acute Distress] : no acute distress [Well Developed] : well developed [Normal Pupil & Iris Size/Symmetry] : normal pupil and iris size and symmetry [No Discharge] : no discharge [No Photophobia] : no photophobia [Sclera Not Icteric] : sclera not icteric [Normal TMs] : both tympanic membranes were normal [Normal Lips/Tongue] : the lips and tongue were normal [Normal Outer Ear/Nose] : the ears and nose were normal in appearance [Normal Tonsils] : normal tonsils [No Thrush] : no thrush [Pale mucosa] : pale mucosa [Boggy Nasal Turbinates] : boggy and/or pale nasal turbinates [Clear Rhinorrhea] : clear rhinorrhea was seen [Supple] : the neck was supple [Normal Rate and Effort] : normal respiratory rhythm and effort [No Crackles] : no crackles [No Retractions] : no retractions [Bilateral Audible Breath Sounds] : bilateral audible breath sounds [Normal Rate] : heart rate was normal  [Normal S1, S2] : normal S1 and S2 [No murmur] : no murmur [Regular Rhythm] : with a regular rhythm [Soft] : abdomen soft [Not Tender] : non-tender [Not Distended] : not distended [No HSM] : no hepato-splenomegaly [Normal Cervical Lymph Nodes] : cervical [Skin Intact] : skin intact  [No Rash] : no rash [No Skin Lesions] : no skin lesions [No clubbing] : no clubbing [No Edema] : no edema [No Cyanosis] : no cyanosis [Normal Mood] : mood was normal [Normal Affect] : affect was normal [Alert, Awake, Oriented as Age-Appropriate] : alert, awake, oriented as age appropriate [de-identified] : swollen nasal turbinates.

## 2022-12-05 ENCOUNTER — APPOINTMENT (OUTPATIENT)
Dept: NEUROLOGY | Facility: CLINIC | Age: 42
End: 2022-12-05

## 2023-06-09 ENCOUNTER — OUTPATIENT (OUTPATIENT)
Dept: OUTPATIENT SERVICES | Facility: HOSPITAL | Age: 43
LOS: 1 days | End: 2023-06-09
Payer: COMMERCIAL

## 2023-06-09 DIAGNOSIS — N64.4 MASTODYNIA: ICD-10-CM

## 2023-06-09 PROCEDURE — 76641 ULTRASOUND BREAST COMPLETE: CPT | Mod: 26,50

## 2023-06-09 PROCEDURE — 76641 ULTRASOUND BREAST COMPLETE: CPT | Mod: 50

## 2023-06-09 PROCEDURE — 77066 DX MAMMO INCL CAD BI: CPT | Mod: 26

## 2023-06-09 PROCEDURE — G0279: CPT | Mod: 26

## 2023-06-09 PROCEDURE — 77066 DX MAMMO INCL CAD BI: CPT

## 2023-06-09 PROCEDURE — G0279: CPT

## 2023-06-10 DIAGNOSIS — N64.4 MASTODYNIA: ICD-10-CM

## 2023-10-19 NOTE — ED ADULT NURSE NOTE - NSFALLRSKASSESSTYPE_ED_ALL_ED
Initial (On Arrival) Odomzo Counseling- I discussed with the patient the risks of Odomzo including but not limited to nausea, vomiting, diarrhea, constipation, weight loss, changes in the sense of taste, decreased appetite, muscle spasms, and hair loss.  The patient verbalized understanding of the proper use and possible adverse effects of Odomzo.  All of the patient's questions and concerns were addressed.

## 2023-11-15 NOTE — ASU DISCHARGE PLAN (ADULT/PEDIATRIC) - C. MAKE IMPORTANT PERSONAL OR BUSINESS DECISIONS
Assessment/Plan   Diagnoses and all orders for this visit:  Accommodative esotropia  Refractive amblyopia of right eye  Strabismic amblyopia of right eye  Hyperopia of both eyes  Regular astigmatism of right eye  Anisometropia    Established patient, stable vision in specs, minor change in refractive error, generally stable, provided updated spec rx, subjective worse vision in contact lens (CL) vs specs, did not bring to office today. Hold on renewal until contact lens (CL) has been evaluated. RTC next available with contact lenses for evaluation.  Unremarkable DFE today, yearly recommended.   
Statement Selected

## 2023-12-01 NOTE — ED PROVIDER NOTE - NS ED MD DISPO ADMITTING SERVICE
MED No. NADIYA screening performed.  STOP BANG Legend: 0-2 = LOW Risk; 3-4 = INTERMEDIATE Risk; 5-8 = HIGH Risk

## 2024-01-15 ENCOUNTER — OUTPATIENT (OUTPATIENT)
Dept: OUTPATIENT SERVICES | Facility: HOSPITAL | Age: 44
LOS: 1 days | End: 2024-01-15
Payer: COMMERCIAL

## 2024-01-15 DIAGNOSIS — Z00.8 ENCOUNTER FOR OTHER GENERAL EXAMINATION: ICD-10-CM

## 2024-01-15 DIAGNOSIS — N28.1 CYST OF KIDNEY, ACQUIRED: ICD-10-CM

## 2024-01-15 PROCEDURE — 76775 US EXAM ABDO BACK WALL LIM: CPT

## 2024-01-15 PROCEDURE — 76775 US EXAM ABDO BACK WALL LIM: CPT | Mod: 26

## 2024-01-16 DIAGNOSIS — N28.1 CYST OF KIDNEY, ACQUIRED: ICD-10-CM

## 2024-04-11 NOTE — ASU PREOP CHECKLIST - ASSESSMENT, HISTORY & PHYSICAL COMPLETED AND ON MEDICAL RECORD
Return call from Nuvia.    Requesting that order be faxed to [facility] in [place] and also to [facility] worker, Mimi at [phone]    Also request that replacement cushion come equipped to [facility] and that it have a \"saddle seat\" to avoid patient from slipping.       done

## 2024-04-15 ENCOUNTER — EMERGENCY (EMERGENCY)
Facility: HOSPITAL | Age: 44
LOS: 0 days | Discharge: HOME | End: 2024-04-15
Attending: EMERGENCY MEDICINE
Payer: COMMERCIAL

## 2024-04-15 VITALS
DIASTOLIC BLOOD PRESSURE: 76 MMHG | TEMPERATURE: 98 F | HEART RATE: 74 BPM | OXYGEN SATURATION: 100 % | SYSTOLIC BLOOD PRESSURE: 126 MMHG | WEIGHT: 128.09 LBS

## 2024-04-15 DIAGNOSIS — R11.0 NAUSEA: ICD-10-CM

## 2024-04-15 DIAGNOSIS — M54.89 OTHER DORSALGIA: ICD-10-CM

## 2024-04-15 DIAGNOSIS — R10.30 LOWER ABDOMINAL PAIN, UNSPECIFIED: ICD-10-CM

## 2024-04-15 DIAGNOSIS — M25.551 PAIN IN RIGHT HIP: ICD-10-CM

## 2024-04-15 DIAGNOSIS — M25.552 PAIN IN LEFT HIP: ICD-10-CM

## 2024-04-15 LAB
APPEARANCE UR: CLEAR — SIGNIFICANT CHANGE UP
BILIRUB UR-MCNC: NEGATIVE — SIGNIFICANT CHANGE UP
COLOR SPEC: YELLOW — SIGNIFICANT CHANGE UP
DIFF PNL FLD: NEGATIVE — SIGNIFICANT CHANGE UP
GLUCOSE UR QL: NEGATIVE MG/DL — SIGNIFICANT CHANGE UP
KETONES UR-MCNC: NEGATIVE MG/DL — SIGNIFICANT CHANGE UP
LEUKOCYTE ESTERASE UR-ACNC: NEGATIVE — SIGNIFICANT CHANGE UP
NITRITE UR-MCNC: NEGATIVE — SIGNIFICANT CHANGE UP
PH UR: 6.5 — SIGNIFICANT CHANGE UP (ref 5–8)
PROT UR-MCNC: NEGATIVE MG/DL — SIGNIFICANT CHANGE UP
SP GR SPEC: 1.01 — SIGNIFICANT CHANGE UP (ref 1–1.03)
UROBILINOGEN FLD QL: 0.2 MG/DL — SIGNIFICANT CHANGE UP (ref 0.2–1)

## 2024-04-15 PROCEDURE — 87086 URINE CULTURE/COLONY COUNT: CPT

## 2024-04-15 PROCEDURE — 99283 EMERGENCY DEPT VISIT LOW MDM: CPT

## 2024-04-15 PROCEDURE — 81003 URINALYSIS AUTO W/O SCOPE: CPT

## 2024-04-15 PROCEDURE — 99284 EMERGENCY DEPT VISIT MOD MDM: CPT

## 2024-04-15 RX ORDER — ACETAMINOPHEN 500 MG
650 TABLET ORAL ONCE
Refills: 0 | Status: COMPLETED | OUTPATIENT
Start: 2024-04-15 | End: 2024-04-15

## 2024-04-15 RX ORDER — METHOCARBAMOL 500 MG/1
2 TABLET, FILM COATED ORAL
Qty: 30 | Refills: 0
Start: 2024-04-15 | End: 2024-04-19

## 2024-04-15 RX ORDER — METHOCARBAMOL 500 MG/1
750 TABLET, FILM COATED ORAL ONCE
Refills: 0 | Status: COMPLETED | OUTPATIENT
Start: 2024-04-15 | End: 2024-04-15

## 2024-04-15 RX ADMIN — METHOCARBAMOL 750 MILLIGRAM(S): 500 TABLET, FILM COATED ORAL at 07:34

## 2024-04-15 RX ADMIN — Medication 650 MILLIGRAM(S): at 07:35

## 2024-04-15 NOTE — ED ADULT TRIAGE NOTE - CHIEF COMPLAINT QUOTE
Presented to ED c/o b/l lower back pain radiating to her lower abd x 2 days. Pt also c/o b/l shoulder pain, headache, generalized body aches. Pt denies trauma, known injury.

## 2024-04-15 NOTE — ED PROVIDER NOTE - PATIENT PORTAL LINK FT
You can access the FollowMyHealth Patient Portal offered by [facility] by registering at the following website: [email or web address]. By joining [facility]’s FollowMyHealth portal, you will also be able to view your health information using other applications (apps) compatible with our system.

## 2024-04-15 NOTE — ED PROVIDER NOTE - OBJECTIVE STATEMENT
Patient is a 43-year-old female with no past medical history presenting to the ED complaining of back pain and abdominal pain.  Patient states on Saturday she started to experience mid to upper back pain that now radiates down her back into her lower abdomen.  Patient states she is never had pain like this before.  Patient has been taking Tylenol for pain.  Last Tylenol was last night.  Patient states she does have a family history of autoimmune diseases.  Patient is being followed by an OB/GYN for "vaginal discharge that she needs an MRI for".  Patient states she was nauseous on the first day but has since resolved.  No recent illnesses.

## 2024-04-15 NOTE — ED PROVIDER NOTE - NSFOLLOWUPINSTRUCTIONS_ED_ALL_ED_FT
Rheumatology   Our Emergency Department Referral Coordinators will be reaching out to you in the next 24-48 hours from 9:00am to 5:00pm with a follow up appointment. Please expect a phone call from the hospital in that time frame. If you do not receive a call or if you have any questions or concerns, you can reach them at   [phone]     Back Pain    Back pain is very common in adults. The cause of back pain is rarely dangerous and the pain often gets better over time. The cause of your back pain may not be known and may include strain of muscles or ligaments, degeneration of the spinal disks, or arthritis. Occasionally the pain may radiate down your leg(s). Over-the-counter medicines to reduce pain and inflammation are often the most helpful. Stretching and remaining active frequently helps the healing process.     SEEK IMMEDIATE MEDICAL CARE IF YOU HAVE ANY OF THE FOLLOWING SYMPTOMS: bowel or bladder control problems, unusual weakness or numbness in your arms or legs, nausea or vomiting, abdominal pain, fever, dizziness/lightheadedness.

## 2024-04-15 NOTE — ED PROVIDER NOTE - PHYSICAL EXAMINATION
CONSTITUTIONAL: well-appearing, in NAD  SKIN: Warm dry, normal skin turgor  HEAD: NCAT  EYES: EOMI, PERRLA, no scleral icterus, conjunctiva pink  ENT: normal pharynx with no erythema or exudates  NECK: Supple; non tender. Full ROM.  CARD: RRR,  RESP: clear to ausculation b/l. No crackles or wheezing.  ABD: soft, non-tender, no rebound or guarding.  EXT: Full ROM, no bony tenderness, no pedal edema, no calf tenderness  NEURO: normal motor. normal sensory.   PSYCH: Cooperative, appropriate.

## 2024-04-15 NOTE — ED PROVIDER NOTE - CLINICAL SUMMARY MEDICAL DECISION MAKING FREE TEXT BOX
Agree with above patient exam  Impression  Patient with bilateral back pain ongoing into bilateral hips.  History suggestive of rheumatological etiology of patient's symptoms.  UA negative.  Patient with unremarkable exam no further workup in the emergency department needed.  Advised outpatient follow-up.

## 2024-04-15 NOTE — ED ADULT NURSE NOTE - OBJECTIVE STATEMENT
Patient presents to the ED with complaints of back pain and abdominal pain that started on Saturday.

## 2024-04-15 NOTE — ED PROVIDER NOTE - DISCHARGE DATE
Learning About When to Call Your Doctor During Pregnancy (After 20 Weeks)  Overview  It's common to have concerns about what might be a problem when you're pregnant. Most pregnancies don't have any serious problems. But it's still important to know when to call your doctor if you have certain symptoms or signs of labor.  These are general suggestions. Your doctor may give you some more information about when to call.  When to call your doctor (after 20 weeks)  Call 911  anytime you think you may need emergency care. For example, call if:  You have severe vaginal bleeding. This means you are soaking through a pad each hour for 2 or more hours.  You have sudden, severe pain in your belly.  You have chest pain, are short of breath, or cough up blood.  You passed out (lost consciousness).  You have a seizure.  You see or feel the umbilical cord.  You think you are about to deliver your baby and can't make it safely to the hospital or birthing center.  Call your doctor now or seek immediate medical care if:  You have vaginal bleeding.  You have belly pain.  You have a fever.  You are dizzy or lightheaded, or you feel like you may faint.  You have signs of a blood clot in your leg (called a deep vein thrombosis), such as:  Pain in the calf, back of the knee, thigh, or groin.  Swelling in your leg or groin.  A color change on the leg or groin. The skin may be reddish or purplish, depending on your usual skin color.  You have symptoms of preeclampsia, such as:  Sudden swelling of your face, hands, or feet.  New vision problems (such as dimness, blurring, or seeing spots).  A severe headache.  You have a sudden release of fluid from your vagina. (You think your water broke.)  You've been having regular contractions for an hour. This means that you've had at least 6 contractions within 1 hour, even after you change your position and drink fluids.  You notice that your baby has stopped moving or is moving less than  "normal.  You have signs of heart failure, such as:  New or increased shortness of breath.  New or worse swelling in your legs, ankles, or feet.  Sudden weight gain, such as more than 2 to 3 pounds in a day or 5 pounds in a week.  Feeling so tired or weak that you cannot do your usual activities.  You have symptoms of a urinary tract infection. These may include:  Pain or burning when you urinate.  A frequent need to urinate without being able to pass much urine.  Pain in the flank, which is just below the rib cage and above the waist on either side of the back.  Blood in your urine.  Watch closely for changes in your health, and be sure to contact your doctor if:  You have vaginal discharge that smells bad.  You feel sad, anxious, or hopeless for more than a few days.  You have skin changes, such as a rash, itching, or a yellow color to your skin.  You have other concerns about your pregnancy.  If you have labor signs at 37 weeks or more  If you have signs of labor at 37 weeks or more, your doctor may tell you to call when your labor becomes more active. Symptoms of active labor include:  Contractions that are regular.  Contractions that are less than 5 minutes apart.  Contractions that are hard to talk through.  Follow-up care is a key part of your treatment and safety. Be sure to make and go to all appointments, and call your doctor if you are having problems. It's also a good idea to know your test results and keep a list of the medicines you take.  Where can you learn more?  Go to https://www.[email or web address].net/patiented  Enter N531 in the search box to learn more about \"Learning About When to Call Your Doctor During Pregnancy (After 20 Weeks).\"  Current as of: July 11, 2023               Content Version: 13.8    5284-8722 [employer].   Care instructions adapted under license by your healthcare professional. If you have questions about a medical condition or this instruction, always ask your healthcare " professional. [employer] disclaims any warranty or liability for your use of this information.       15-Apr-2024

## 2024-04-16 LAB
CULTURE RESULTS: SIGNIFICANT CHANGE UP
SPECIMEN SOURCE: SIGNIFICANT CHANGE UP

## 2024-05-16 ENCOUNTER — APPOINTMENT (OUTPATIENT)
Dept: RHEUMATOLOGY | Facility: CLINIC | Age: 44
End: 2024-05-16

## 2024-05-25 ENCOUNTER — OUTPATIENT (OUTPATIENT)
Dept: OUTPATIENT SERVICES | Facility: HOSPITAL | Age: 44
LOS: 1 days | End: 2024-05-25
Payer: COMMERCIAL

## 2024-05-25 DIAGNOSIS — Z00.8 ENCOUNTER FOR OTHER GENERAL EXAMINATION: ICD-10-CM

## 2024-05-25 DIAGNOSIS — R10.2 PELVIC AND PERINEAL PAIN: ICD-10-CM

## 2024-05-25 PROCEDURE — 72197 MRI PELVIS W/O & W/DYE: CPT | Mod: 26

## 2024-05-25 PROCEDURE — 72197 MRI PELVIS W/O & W/DYE: CPT

## 2024-05-25 PROCEDURE — A9579: CPT

## 2024-05-26 DIAGNOSIS — R10.2 PELVIC AND PERINEAL PAIN: ICD-10-CM

## 2024-08-16 ENCOUNTER — APPOINTMENT (OUTPATIENT)
Dept: VASCULAR SURGERY | Facility: CLINIC | Age: 44
End: 2024-08-16
Payer: COMMERCIAL

## 2024-08-16 VITALS — HEART RATE: 78 BPM | SYSTOLIC BLOOD PRESSURE: 108 MMHG | DIASTOLIC BLOOD PRESSURE: 75 MMHG

## 2024-08-16 VITALS — HEIGHT: 62 IN | WEIGHT: 125 LBS | BODY MASS INDEX: 23 KG/M2

## 2024-08-16 DIAGNOSIS — I83.891 VARICOSE VEINS OF RIGHT LOWER EXTREMITY WITH OTHER COMPLICATIONS: ICD-10-CM

## 2024-08-16 PROCEDURE — 99204 OFFICE O/P NEW MOD 45 MIN: CPT

## 2024-08-16 PROCEDURE — 93971 EXTREMITY STUDY: CPT

## 2024-08-16 RX ORDER — ESCITALOPRAM OXALATE 20 MG/1
TABLET ORAL
Refills: 0 | Status: ACTIVE | COMMUNITY

## 2024-08-16 RX ORDER — PANTOPRAZOLE SODIUM 40 MG/1
GRANULE, DELAYED RELEASE ORAL
Refills: 0 | Status: ACTIVE | COMMUNITY

## 2024-08-16 NOTE — DATA REVIEWED
[FreeTextEntry1] : I performed a venous duplex which was medically necessary to evaluate for venous reflux. It showed no evidence of DVT or phlebitis, R GSV is incompetent and positive for reflux with diameter of 12 mm and refill time > 6 seconds.

## 2024-08-16 NOTE — ASSESSMENT
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 leg heaviness and leg fatigue after prolonged walking.  Venous duplex showed no evidence of DVT or phlebitis, R GSV is incompetent and positive for reflux with diameter of 12 mm and refill time > 6 seconds.   I offered her endovenous ablation of right GSV to prevent complications of venous hypertension such as thrombophlebitis, venous ulcers, stasis dermatitis, lower extremity cellulitis and chronic leg swelling.  Patient understands the risks of recurrence, bleeding, infection, rethrombosis, nerve injury, wound complications, injury to the catheterized vessel and need for additional procedures.  I spent 45 minutes with patient performing physical exam, reviewing duplex results, discussing treatment plan and follow up.   I, Dr. Thomas, personally performed the evaluation and management (E/M) services for this established patient who presents today with (a) new problem(s)/exacerbation of (an) existing condition(s). That E/M includes conducting the clinically appropriate interval history &/or exam, assessing all new/exacerbated conditions, and establishing a new plan of care. Today, my MILTON, Ava], was here to observe my evaluation and management service for this new problem/exacerbated condition and follow the plan of care established by me going forward.  Thank you for allowing me to participate in the care of your patient.   Sincerely,   Rustam Thomas MD, RPVI, FACS Associate Professor of Surgery , Vascular Fellowship Director of Limb Salvage Surgery [facility] [employer] [facility] at [facility]

## 2024-08-16 NOTE — HISTORY OF PRESENT ILLNESS
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 leg heaviness and leg fatigue after prolonged walking.

## 2024-09-24 ENCOUNTER — APPOINTMENT (OUTPATIENT)
Dept: VASCULAR SURGERY | Facility: CLINIC | Age: 44
End: 2024-09-24

## 2025-05-14 ENCOUNTER — OUTPATIENT (OUTPATIENT)
Dept: OUTPATIENT SERVICES | Facility: HOSPITAL | Age: 45
LOS: 1 days | End: 2025-05-14
Payer: COMMERCIAL

## 2025-05-14 DIAGNOSIS — N64.4 MASTODYNIA: ICD-10-CM

## 2025-05-14 PROCEDURE — 77066 DX MAMMO INCL CAD BI: CPT

## 2025-05-14 PROCEDURE — 76641 ULTRASOUND BREAST COMPLETE: CPT | Mod: 50

## 2025-05-14 PROCEDURE — 76641 ULTRASOUND BREAST COMPLETE: CPT | Mod: 26,50

## 2025-05-14 PROCEDURE — G0279: CPT

## 2025-05-14 PROCEDURE — 77066 DX MAMMO INCL CAD BI: CPT | Mod: 26

## 2025-05-14 PROCEDURE — 77062 BREAST TOMOSYNTHESIS BI: CPT | Mod: 26

## 2025-05-15 DIAGNOSIS — R92.8 OTHER ABNORMAL AND INCONCLUSIVE FINDINGS ON DIAGNOSTIC IMAGING OF BREAST: ICD-10-CM
